# Patient Record
Sex: FEMALE | Race: WHITE | Employment: OTHER | URBAN - METROPOLITAN AREA
[De-identification: names, ages, dates, MRNs, and addresses within clinical notes are randomized per-mention and may not be internally consistent; named-entity substitution may affect disease eponyms.]

---

## 2019-06-04 ENCOUNTER — OFFICE VISIT (OUTPATIENT)
Dept: FAMILY MEDICINE CLINIC | Age: 64
End: 2019-06-04

## 2019-06-04 ENCOUNTER — HOSPITAL ENCOUNTER (OUTPATIENT)
Dept: LAB | Age: 64
Discharge: HOME | End: 2019-06-04
Payer: MEDICARE

## 2019-06-04 VITALS
RESPIRATION RATE: 18 BRPM | TEMPERATURE: 97.9 F | HEIGHT: 68 IN | BODY MASS INDEX: 17.58 KG/M2 | DIASTOLIC BLOOD PRESSURE: 80 MMHG | OXYGEN SATURATION: 97 % | WEIGHT: 116 LBS | SYSTOLIC BLOOD PRESSURE: 114 MMHG | HEART RATE: 55 BPM

## 2019-06-04 DIAGNOSIS — D50.8 OTHER IRON DEFICIENCY ANEMIA: ICD-10-CM

## 2019-06-04 DIAGNOSIS — K92.1 MELENA: ICD-10-CM

## 2019-06-04 DIAGNOSIS — R10.11 RUQ PAIN: Primary | ICD-10-CM

## 2019-06-04 DIAGNOSIS — R53.81 MALAISE AND FATIGUE: ICD-10-CM

## 2019-06-04 DIAGNOSIS — R63.4 WEIGHT LOSS: ICD-10-CM

## 2019-06-04 DIAGNOSIS — R10.11 RUQ PAIN: ICD-10-CM

## 2019-06-04 DIAGNOSIS — R53.83 MALAISE AND FATIGUE: ICD-10-CM

## 2019-06-04 LAB
ALBUMIN SERPL-MCNC: 4.2 G/DL (ref 3.4–5)
ALBUMIN/GLOB SERPL: 1.6 {RATIO} (ref 0.8–1.7)
ALP SERPL-CCNC: 75 U/L (ref 45–117)
ALT SERPL-CCNC: 22 U/L (ref 13–56)
ANION GAP SERPL CALC-SCNC: 5 MMOL/L (ref 3–18)
AST SERPL-CCNC: 11 U/L (ref 15–37)
BASOPHILS # BLD: 0 K/UL (ref 0–0.1)
BASOPHILS NFR BLD: 0 % (ref 0–2)
BILIRUB SERPL-MCNC: 0.4 MG/DL (ref 0.2–1)
BUN SERPL-MCNC: 13 MG/DL (ref 7–18)
BUN/CREAT SERPL: 20 (ref 12–20)
CALCIUM SERPL-MCNC: 8.5 MG/DL (ref 8.5–10.1)
CHLORIDE SERPL-SCNC: 107 MMOL/L (ref 100–108)
CO2 SERPL-SCNC: 30 MMOL/L (ref 21–32)
CREAT SERPL-MCNC: 0.66 MG/DL (ref 0.6–1.3)
DIFFERENTIAL METHOD BLD: ABNORMAL
EOSINOPHIL # BLD: 0.1 K/UL (ref 0–0.4)
EOSINOPHIL NFR BLD: 2 % (ref 0–5)
ERYTHROCYTE [DISTWIDTH] IN BLOOD BY AUTOMATED COUNT: 12.6 % (ref 11.6–14.5)
FERRITIN SERPL-MCNC: 56 NG/ML (ref 8–388)
GLOBULIN SER CALC-MCNC: 2.7 G/DL (ref 2–4)
GLUCOSE SERPL-MCNC: 82 MG/DL (ref 74–99)
HCT VFR BLD AUTO: 36.7 % (ref 35–45)
HGB BLD-MCNC: 11.3 G/DL (ref 12–16)
IRON SATN MFR SERPL: 17 %
IRON SERPL-MCNC: 53 UG/DL (ref 50–175)
LIPASE SERPL-CCNC: 80 U/L (ref 73–393)
LYMPHOCYTES # BLD: 1.4 K/UL (ref 0.9–3.6)
LYMPHOCYTES NFR BLD: 31 % (ref 21–52)
MCH RBC QN AUTO: 28.6 PG (ref 24–34)
MCHC RBC AUTO-ENTMCNC: 30.8 G/DL (ref 31–37)
MCV RBC AUTO: 92.9 FL (ref 74–97)
MONOCYTES # BLD: 0.3 K/UL (ref 0.05–1.2)
MONOCYTES NFR BLD: 7 % (ref 3–10)
NEUTS SEG # BLD: 2.7 K/UL (ref 1.8–8)
NEUTS SEG NFR BLD: 60 % (ref 40–73)
PLATELET # BLD AUTO: 148 K/UL (ref 135–420)
PMV BLD AUTO: 12.5 FL (ref 9.2–11.8)
POTASSIUM SERPL-SCNC: 4.8 MMOL/L (ref 3.5–5.5)
PROT SERPL-MCNC: 6.9 G/DL (ref 6.4–8.2)
RBC # BLD AUTO: 3.95 M/UL (ref 4.2–5.3)
SODIUM SERPL-SCNC: 142 MMOL/L (ref 136–145)
TIBC SERPL-MCNC: 303 UG/DL (ref 250–450)
WBC # BLD AUTO: 4.5 K/UL (ref 4.6–13.2)

## 2019-06-04 PROCEDURE — 82728 ASSAY OF FERRITIN: CPT

## 2019-06-04 PROCEDURE — 80053 COMPREHEN METABOLIC PANEL: CPT

## 2019-06-04 PROCEDURE — 83540 ASSAY OF IRON: CPT

## 2019-06-04 PROCEDURE — 82150 ASSAY OF AMYLASE: CPT

## 2019-06-04 PROCEDURE — 83690 ASSAY OF LIPASE: CPT

## 2019-06-04 PROCEDURE — 85025 COMPLETE CBC W/AUTO DIFF WBC: CPT

## 2019-06-04 NOTE — PATIENT INSTRUCTIONS
Results/Referral Notifications: Please contact our office if you have not received a call or MyChart message with the results of your tests or with an appointment plan for any referrals/studies within one week. No news is not good news; it's no news. Prescriptions: It is my practice to ensure that any prescription I generate has a large enough supply with enough refills to last you through and beyond the time I am next expecting to see you. If your medications are running low, contact your pharmacy for a refill. If there are no refills remaining, OR if it has been one calendar year since the prescription was generated, it is time for you to see me to reassess the status of your condition and to determine whether the medication is still appropriate. It is the patient's responsibility to ensure follow up appointments are scheduled before medications run out. Details depending, once a visit has been scheduled, a request for a small supply to may be authorized. Please review the list of your current medications (name, formulation, strength and dosing instructions) and allergies and call us if there is an error. It is good practice to bring all of your prescriptions, over the counter medications and herbal supplements to each visit. Learn what conditions your medications are treating. Know which doctor is responsible for managing each condition. If you are needing more medication, contact the office of the doctor managing the condition for which it was prescribed.

## 2019-06-04 NOTE — PROGRESS NOTES
Allyn Calderón is a 75y.o. year old female who is a new patient to me today. Assessment & Plan:       ICD-10-CM ICD-9-CM    1. RUQ pain R10.11 789.01 CBC WITH AUTOMATED DIFF      FERRITIN      IRON PROFILE      METABOLIC PANEL, COMPREHENSIVE      AMYLASE ISOENZYMES      LIPASE      REFERRAL TO GASTROENTEROLOGY      [facility]   2. Melena K92.1 578.1 CBC WITH AUTOMATED DIFF      FERRITIN      IRON PROFILE      REFERRAL TO GASTROENTEROLOGY   3. Weight loss R63.4 783.21 REFERRAL TO GASTROENTEROLOGY   4. Malaise and fatigue R53.81 780.79     R53.83       RUQ pain certainly could be due to GB disease, with weight loss from the self imposed dietary restriction vs baseline. However, the abnormal stools 2 months ago and continued malaise and weakness despite having maintained a healthy diet are concerning. DDx includes but is not limited to PUD, pancreatitis, hepatitis, colon AVM, benign and malignant neoplasms. Requested she provide details on her herbs    Labs, image and refer  Low threshold for CT  Subject to change    Subjective:   Allyn Calderón was seen today for Establish Care    complains of \"congestion\" RUQ: \"full\" feeling with radiation to the right flank starting early April. Constant x 6 first weeks, spontaneous improvement, then episodic with trigger foods (BBQ, fried fish) and also triggered by eating large meals. On 2 occasions \"full\" pain became \"sharp\" when rotated torso to the left while experiencing fullness. Had been associated with constipation    Has an herbalist Titi davies\" who she called for herbs to help with constipation, now with satisfactory elimination daily most days/week    Changed diet content and size: eliminated fatty/fried foods, citrus fruits, dairy, eggs - not sure whether the citrus was causative but other foods did seem to trigger pain    Focusing on grains, which she grinds herself, vegetables, some fish and chicken perhaps 1 day/week.  Consuming 4 average soup bowl sized portions 4x/day    Changes have relieved RUQ pain completely    Resulting weight loss: down from 123-126 baseline to current state in the past 2 months, 2 notches tighter on her belt    Feels weak, tires easily      Soc: moved here in January from [facility], single, boyfriend; retired massage therapist; teaches yoga  [employer]      Brings outside labs from April (approx 1 week into symptoms) which show normal CBC, CMP and lipids    Describes a CCa fecal test late 2018    No care team member to display    No Known Allergies    Outpatient Medications Marked as Taking for the 6/4/19 encounter (Office Visit) with Ellyn Reese MD   Medication Sig Dispense Refill    OTHER Indications: Patient takes herbs from [employer]         There are no active problems to display for this patient. History reviewed. No pertinent past medical history.     Past Surgical History:   Procedure Laterality Date    HX GYN      Fibroidectomy x 2    HX HYSTERECTOMY         Family History   Problem Relation Age of Onset    Alcohol abuse Mother     Alcohol abuse Father     Bleeding Prob Father     Alcohol abuse Maternal Grandmother     Alcohol abuse Maternal Grandfather     Alcohol abuse Paternal Grandmother     Alcohol abuse Paternal Grandfather        Social History     Socioeconomic History    Marital status: SINGLE     Spouse name: Not on file    Number of children: Not on file    Years of education: Not on file    Highest education level: Not on file   Occupational History    Not on file   Social Needs    Financial resource strain: Not on file    Food insecurity:     Worry: Not on file     Inability: Not on file    Transportation needs:     Medical: Not on file     Non-medical: Not on file   Tobacco Use    Smoking status: Never Smoker    Smokeless tobacco: Never Used   Substance and Sexual Activity    Alcohol use: Not Currently     Frequency: Never    Drug use: Never    Sexual activity: Not on file Lifestyle    Physical activity:     Days per week: Not on file     Minutes per session: Not on file    Stress: Not on file   Relationships    Social connections:     Talks on phone: Not on file     Gets together: Not on file     Attends Congregation service: Not on file     Active member of club or organization: Not on file     Attends meetings of clubs or organizations: Not on file     Relationship status: Not on file    Intimate partner violence:     Fear of current or ex partner: Not on file     Emotionally abused: Not on file     Physically abused: Not on file     Forced sexual activity: Not on file   Other Topics Concern    Not on file   Social History Narrative    Not on file            Review of Systems   Constitutional: Negative for fever. Respiratory: Negative for cough and hemoptysis. Cardiovascular: Positive for chest pain (remote, benign evaluation). Negative for leg swelling. Gastrointestinal: Positive for melena (approx 2 months ago x 10 days). Negative for blood in stool, nausea and vomiting. Genitourinary: Negative for dysuria, frequency, hematuria and urgency. Musculoskeletal: Negative for myalgias. Skin: Negative for rash. Neurological: Negative for focal weakness. Endo/Heme/Allergies: Does not bruise/bleed easily. Psychiatric/Behavioral: Negative for depression. Objective:     Visit Vitals  /80   Pulse (!) 55   Temp 97.9 °F (36.6 °C) (Oral)   Resp 18   Ht 5' 8\" (1.727 m)   Wt 166 lb 6.4 oz (75.5 kg)   SpO2 97%   BMI 25.30 kg/m²      Physical Exam   Constitutional: She is oriented to person, place, and time. She appears well-developed. No distress. Pleasant thin white female   HENT:   Head: Normocephalic and atraumatic. Right Ear: External ear normal.   Left Ear: External ear normal.   Mouth/Throat: Oropharynx is clear and moist.   Eyes: Conjunctivae are normal. Right eye exhibits no discharge. Left eye exhibits no discharge. No scleral icterus.    Neck: Neck supple. Cardiovascular: Normal rate, regular rhythm and normal heart sounds. Exam reveals no gallop and no friction rub. No murmur heard. Pulmonary/Chest: Effort normal and breath sounds normal. No respiratory distress. She has no wheezes. She has no rhonchi. She has no rales. Abdominal: Soft. Bowel sounds are normal. She exhibits no distension and no mass. There is no tenderness. Musculoskeletal: She exhibits no edema. Lymphadenopathy:     She has no cervical adenopathy. Neurological: She is alert and oriented to person, place, and time. No gross deficits   Skin: Skin is warm and dry. No rash noted. She is not diaphoretic. No cyanosis. Nails show no clubbing. Psychiatric: She has a normal mood and affect. Her behavior is normal. Judgment and thought content normal.                      Disclaimer: The patient understands our medical plan. Alternatives have been explained and offered. The risks, benefits and significant side effects of all medications have been reviewed. Anticipated time course and progression of condition reviewed. All questions have been addressed. She is encouraged to employ the information provided in the after visit summary, which was reviewed. Where applicable, she is instructed to call the clinic if she has not been notified either by phone or through [address] with the results of her tests or with an appointment plan for any referrals within 1 week(s). No news is not good news; it's no news. The patient  is to call if her condition worsens or fails to improve or if significant side effects are experienced. Aspects of this note may have been generated using voice recognition software. Despite editing, there may be unrecognized errors.        Roxie Bernard MD

## 2019-06-04 NOTE — PROGRESS NOTES
New patient states she is here to establish care she states she would like to discuss some GI issues with provider. Patient has asked she be roomed with exam room door opened. Medication reconciliation has been completed with patient. Care team discussed/updated as well as pharmacy. Health Maintenance reviewed Patient states she has her previous records and will email them to the office via [employer].

## 2019-06-06 ENCOUNTER — TELEPHONE (OUTPATIENT)
Dept: FAMILY MEDICINE CLINIC | Age: 64
End: 2019-06-06

## 2019-06-06 LAB
AMYLASE P SERPL-CCNC: 16 U/L (ref 10–53)
AMYLASE S SERPL-CCNC: 34 U/L (ref 11–83)
AMYLASE SERPL-CCNC: 50 U/L (ref 31–124)

## 2019-06-06 RX ORDER — LANOLIN ALCOHOL/MO/W.PET/CERES
325 CREAM (GRAM) TOPICAL
Qty: 60 TAB | Refills: 0 | Status: SHIPPED | OUTPATIENT
Start: 2019-06-06 | End: 2020-12-08 | Stop reason: SINTOL

## 2019-06-06 NOTE — PROGRESS NOTES
Please schedule follow up for iron deficiency/RUQ pain in 1 month with labs prior. Please forward labs along with GI referral to Dr. Marcus Rubinstein.  FERN

## 2019-06-06 NOTE — TELEPHONE ENCOUNTER
Patient called at 11:29 requesting a call back from angelina, she have a question about information she is seeing on my Chart. Please call [phone].

## 2019-06-07 NOTE — TELEPHONE ENCOUNTER
Called patient had her verify her  she was told had sent her a [employer] message letting her know she can send in her information to us this way.

## 2019-06-13 ENCOUNTER — TELEPHONE (OUTPATIENT)
Dept: FAMILY MEDICINE CLINIC | Age: 64
End: 2019-06-13

## 2019-06-13 ENCOUNTER — HOSPITAL ENCOUNTER (OUTPATIENT)
Dept: ULTRASOUND IMAGING | Age: 64
Discharge: HOME | End: 2019-06-13
Attending: FAMILY MEDICINE
Payer: MEDICARE

## 2019-06-13 DIAGNOSIS — R10.11 RUQ PAIN: ICD-10-CM

## 2019-06-13 PROCEDURE — 76705 ECHO EXAM OF ABDOMEN: CPT

## 2019-06-13 NOTE — TELEPHONE ENCOUNTER
Called patient back no answer left message letting her know I was returning her call also told I let her know Dr. Juan Ramon Ulloa had sent a [employer] message to her on her results. Asked that she call the office back office number left.

## 2019-06-13 NOTE — TELEPHONE ENCOUNTER
Patient would like a return call in ref to her [facility] results. Please call [phone]

## 2019-06-13 NOTE — PROGRESS NOTES
Allen Guaman, please ensure her GI consultation has been scheduled. \"Normal study, Memory Clear, including the finding of no gallstones. This makes an evaluation by a gastroenterologist all the more important to determine the source of your pain and blood loss.  KJS\"

## 2019-06-18 NOTE — TELEPHONE ENCOUNTER
Called patient had her verify her  she has seen the [employer] message and has been seen by GI

## 2019-07-22 ENCOUNTER — LAB ONLY (OUTPATIENT)
Dept: FAMILY MEDICINE CLINIC | Age: 64
End: 2019-07-22

## 2019-07-22 ENCOUNTER — HOSPITAL ENCOUNTER (OUTPATIENT)
Dept: LAB | Age: 64
Discharge: HOME | End: 2019-07-22
Payer: MEDICARE

## 2019-07-22 DIAGNOSIS — R53.81 MALAISE AND FATIGUE: ICD-10-CM

## 2019-07-22 DIAGNOSIS — R53.83 MALAISE AND FATIGUE: ICD-10-CM

## 2019-07-22 DIAGNOSIS — D50.8 OTHER IRON DEFICIENCY ANEMIA: Primary | ICD-10-CM

## 2019-07-22 DIAGNOSIS — D50.8 OTHER IRON DEFICIENCY ANEMIA: ICD-10-CM

## 2019-07-22 LAB
BASOPHILS # BLD: 0 K/UL (ref 0–0.1)
BASOPHILS NFR BLD: 0 % (ref 0–2)
DIFFERENTIAL METHOD BLD: ABNORMAL
EOSINOPHIL # BLD: 0.1 K/UL (ref 0–0.4)
EOSINOPHIL NFR BLD: 1 % (ref 0–5)
ERYTHROCYTE [DISTWIDTH] IN BLOOD BY AUTOMATED COUNT: 12.8 % (ref 11.6–14.5)
HCT VFR BLD AUTO: 41.5 % (ref 35–45)
HGB BLD-MCNC: 13 G/DL (ref 12–16)
LYMPHOCYTES # BLD: 1.5 K/UL (ref 0.9–3.6)
LYMPHOCYTES NFR BLD: 34 % (ref 21–52)
MCH RBC QN AUTO: 29.5 PG (ref 24–34)
MCHC RBC AUTO-ENTMCNC: 31.3 G/DL (ref 31–37)
MCV RBC AUTO: 94.3 FL (ref 74–97)
MONOCYTES # BLD: 0.5 K/UL (ref 0.05–1.2)
MONOCYTES NFR BLD: 10 % (ref 3–10)
NEUTS SEG # BLD: 2.5 K/UL (ref 1.8–8)
NEUTS SEG NFR BLD: 55 % (ref 40–73)
PLATELET # BLD AUTO: 167 K/UL (ref 135–420)
PMV BLD AUTO: 12.5 FL (ref 9.2–11.8)
RBC # BLD AUTO: 4.4 M/UL (ref 4.2–5.3)
WBC # BLD AUTO: 4.5 K/UL (ref 4.6–13.2)

## 2019-07-22 PROCEDURE — 85025 COMPLETE CBC W/AUTO DIFF WBC: CPT

## 2019-07-30 ENCOUNTER — OFFICE VISIT (OUTPATIENT)
Dept: FAMILY MEDICINE CLINIC | Age: 64
End: 2019-07-30

## 2019-07-30 VITALS
BODY MASS INDEX: 17.43 KG/M2 | RESPIRATION RATE: 12 BRPM | HEIGHT: 68 IN | SYSTOLIC BLOOD PRESSURE: 96 MMHG | HEART RATE: 67 BPM | DIASTOLIC BLOOD PRESSURE: 68 MMHG | TEMPERATURE: 98.4 F | OXYGEN SATURATION: 94 % | WEIGHT: 115 LBS

## 2019-07-30 DIAGNOSIS — R63.6 UNDERWEIGHT: ICD-10-CM

## 2019-07-30 DIAGNOSIS — D50.8 OTHER IRON DEFICIENCY ANEMIA: Primary | ICD-10-CM

## 2019-07-30 DIAGNOSIS — L98.9 BENIGN SKIN LESION: ICD-10-CM

## 2019-07-30 PROBLEM — D64.9 ABSOLUTE ANEMIA: Status: ACTIVE | Noted: 2019-07-30

## 2019-07-30 NOTE — PROGRESS NOTES
Kendrick Vo is a 75 y.o. female who was seen in clinic today (7/30/2019). Assessment & Plan:       ICD-10-CM ICD-9-CM    1. Other iron deficiency anemia D50.8 280.8 CBC WITH AUTOMATED DIFF      FERRITIN      IRON PROFILE   2. Underweight R63.6 783.22    3. Benign skin lesion L98.9 709.9      Iron: improving  Weight: unchanged despite control of constipation and normalization of dietary intake. Short term follow up    Benign skin lesion: schedule destruction or excision as prefers  Discussed risks: pain, scarring, repeat procedure. Follow-up and Dispositions    · Return in about 4 months (around 11/30/2019) for iron deficiency and underweight status follow up, non fasting labs 1 week prior, , MWV same day. Subjective:   Kendrick Vo was seen today for Anemia    Follow-up iron deficiency anemia, supplementing as per MA Philmore Severance' note. Post colo showing potential non malignant bleeding source in the cecum. No follow up with GI scheduled. No clear explanation for prior constipation or discomfort with eating, but now resolved/controlled with herbs in addition to the high iron herbs she's also consuming    Now able to eat without discomfort x > 1 month    Swimming again    Irritation of preexisting skin lesion right upper back since started swimming    Results for orders placed or performed during the hospital encounter of 07/22/19   CBC WITH AUTOMATED DIFF   Result Value Ref Range    WBC 4.5 (L) 4.6 - 13.2 K/uL    RBC 4.40 4.20 - 5.30 M/uL    HGB 13.0 12.0 - 16.0 g/dL    HCT 41.5 35.0 - 45.0 %    MCV 94.3 74.0 - 97.0 FL    MCH 29.5 24.0 - 34.0 PG    MCHC 31.3 31.0 - 37.0 g/dL    RDW 12.8 11.6 - 14.5 %    PLATELET 517 397 - 749 K/uL    MPV 12.5 (H) 9.2 - 11.8 FL    NEUTROPHILS 55 40 - 73 %    LYMPHOCYTES 34 21 - 52 %    MONOCYTES 10 3 - 10 %    EOSINOPHILS 1 0 - 5 %    BASOPHILS 0 0 - 2 %    ABS. NEUTROPHILS 2.5 1.8 - 8.0 K/UL    ABS. LYMPHOCYTES 1.5 0.9 - 3.6 K/UL    ABS.  MONOCYTES 0.5 0.05 - 1.2 K/UL    ABS. EOSINOPHILS 0.1 0.0 - 0.4 K/UL    ABS. BASOPHILS 0.0 0.0 - 0.1 K/UL    DF AUTOMATED          Outpatient Medications Marked as Taking for the 7/30/19 encounter (Office Visit) with Jalen Beckford MD   Medication Sig Dispense Refill    OTHER Take 1 Packet by mouth daily. Indications: Powered Amalaki Fruit      OTHER Take 2 Caps by mouth daily. Indications: Liver Detox Capsule      OTHER Indications: Faby Amalaki      OTHER Indications: Punamavadi      OTHER Indications: Vidanga      OTHER Indications: Manjistha      OTHER Indications: Kutki      OTHER Indications: Guduchi      OTHER Indications: Neem      OTHER Indications: Kalmag      OTHER Indications: Mahasudarshan Churna      OTHER Indications: Licorice      OTHER Take 3 Packets by mouth after meals as needed. Indications: Avipattikar Powder      ginger root (GINGER EXTRACT PO) Take  by mouth.  OTHER Indications: Piper nigrum      OTHER Indications: Piper Longum      OTHER Indications: Haritaki Fruit      OTHER Indications: Bibhitaki Fruit      OTHER Indications: Amalaki      OTHER Indications: Musta Rizome      OTHER Indications: Becky Seed         There are no active problems to display for this patient. No Known Allergies      Patient Care Team:  Jalen Beckford MD as PCP - [facility])    The following sections were reviewed & updated as appropriate: PMH, PSH, FH, and SH. Review of Systems   Constitutional: Negative for malaise/fatigue. Gastrointestinal: Negative for blood in stool. Neurological: Negative for dizziness. Objective:     Visit Vitals  BP 96/68   Pulse 67   Temp 98.4 °F (36.9 °C) (Oral)   Resp 12   Ht 5' 8\" (1.727 m)   Wt 115 lb (52.2 kg)   SpO2 94%   BMI 17.49 kg/m²      Physical Exam   Constitutional: She is oriented to person, place, and time. She appears well-developed. No distress.    Pleasant thin white female   HENT:   Head: Normocephalic and atraumatic. Pulmonary/Chest: Effort normal.   Neurological: She is alert and oriented to person, place, and time. Skin:        Psychiatric: She has a normal mood and affect. Her behavior is normal. Judgment and thought content normal.         Disclaimer: The patient understands our medical plan. Alternatives have been explained and offered. The risks, benefits and significant side effects of all medications have been reviewed. Anticipated time course and progression of condition reviewed. All questions have been addressed. She is encouraged to employ the information provided in the after visit summary, which was reviewed. Where applicable, she is instructed to call the clinic if she has not been notified either by phone or through [address] with the results of her tests or with an appointment plan for any referrals within 1 week(s). No news is not good news; it's no news. The patient  is to call if her condition worsens or fails to improve or if significant side effects are experienced. Aspects of this note may have been generated using voice recognition software. Despite editing, there may be unrecognized errors.        Kaleb Arellano MD

## 2019-07-30 NOTE — PROGRESS NOTES
Patient here for follow up on anemia. She is not taking her Ferrous Sulfate and says she is taking herbs that are rich in iron, she also is making herself a tea that is also rich in iron as well as has increased iron rich foods. 1. Have you been to the ER, urgent care clinic since your last visit? Hospitalized since your last visit? No  2. Have you seen or consulted any other health care providers outside of the [address] [place] [address] since your last visit? Include any pap smears or colon screening. Yes When: July 2019 Where: [place] [address] Reason for visit: colonoscopy    Medication reconciliation has been completed with patient. Care team discussed/updated as well as pharmacy. Health Maintenance Due   Topic Date Due    Hepatitis C Screening  1955    COLONOSCOPY  05/24/1971    DTaP/Tdap/Td series (1 - Tdap) 05/24/1974    Shingrix Vaccine Age 50> (1 of 2) 05/24/2003    BREAST CANCER SCRN MAMMOGRAM  05/24/2003    GLAUCOMA SCREENING Q2Y  05/24/2018    Bone Densitometry (Dexa) Screening  05/24/2018    Pneumococcal 65+ years (1 of 2 - PCV13) 05/24/2018    MEDICARE YEARLY EXAM  06/04/2019     Health Maintenance reviewed - Patient has been asked to schedule her [address] Lakeisha Wick

## 2019-08-02 ENCOUNTER — OFFICE VISIT (OUTPATIENT)
Dept: FAMILY MEDICINE CLINIC | Age: 64
End: 2019-08-02

## 2019-08-02 VITALS
OXYGEN SATURATION: 94 % | HEIGHT: 68 IN | WEIGHT: 114.2 LBS | HEART RATE: 62 BPM | DIASTOLIC BLOOD PRESSURE: 66 MMHG | TEMPERATURE: 98.2 F | RESPIRATION RATE: 16 BRPM | SYSTOLIC BLOOD PRESSURE: 110 MMHG | BODY MASS INDEX: 17.31 KG/M2

## 2019-08-02 DIAGNOSIS — D22.9 BENIGN NEVUS OF SKIN: Primary | ICD-10-CM

## 2019-08-02 NOTE — PATIENT INSTRUCTIONS
Wound Care: After Your Visit Your Care Instructions Taking good care of your wound at home will help it heal quickly and reduce your chance of infection. The doctor has checked you carefully, but problems can develop later. If you notice any problems or new symptoms, get medical treatment right away. Follow-up care is a key part of your treatment and safety. Be sure to make and go to all appointments, and call your doctor if you are having problems. It's also a good idea to know your test results and keep a list of the medicines you take. How can you care for yourself at home? · Clean the area with soap and water 2 times a day unless your doctor gives you different instructions. Don't use hydrogen peroxide or alcohol, which can slow healing. ¨ You may cover the wound with a thin layer of antibiotic ointment, such as bacitracin, and a nonstick bandage. ¨ Apply more ointment and replace the bandage as needed. · Take pain medicines exactly as directed. Some pain is normal with a wound, but do not ignore pain that is getting worse instead of better. You could have an infection. ¨ If the doctor gave you a prescription medicine for pain, take it as prescribed. ¨ If you are not taking a prescription pain medicine, ask your doctor if you can take an over-the-counter medicine. · Your doctor may have closed your wound with stitches (sutures), staples, or skin glue. ¨ If you have stitches, your doctor may remove them after several days to 2 weeks. Or you may have stitches that dissolve on their own. ¨ If you have staples, your doctor may remove them after 7 to 10 days. ¨ If your wound was closed with skin glue, the glue will wear off in a few days to 2 weeks. When should you call for help? Call your doctor now or seek immediate medical care if: 
· You have signs of infection, such as: 
¨ Increased pain, swelling, warmth, or redness near the wound. ¨ Red streaks leading from the wound. ¨ Pus draining from the wound. ¨ A fever. · You bleed so much from your incision that you soak one or more bandages over 2 to 4 hours. Watch closely for changes in your health, and be sure to contact your doctor if: · The wound is not getting better each day. Where can you learn more? Go to [employer] Enter M467 in the search box to learn more about \"Wound Care: After Your Visit. \"  
© 8793-2654 Healthwise, [employer]orporated. Care instructions adapted under license by [facility] (which disclaims liability or warranty for this information). This care instruction is for use with your licensed healthcare professional. If you have questions about a medical condition or this instruction, always ask your healthcare professional. [address] any warranty or liability for your use of this information. Content Version: 17.7.049693; Last Revised: April 23, 2012

## 2019-08-02 NOTE — PROGRESS NOTES
Arcenio Tan is a 75 y.o. female who was seen in clinic today (8/2/2019). Assessment & Plan:       ICD-10-CM ICD-9-CM    1. Benign nevus of skin D22.9 216.9 DESTRUC BENIGN LESION, UP TO 14 LESIONS ([id number])      Medically indicated due to irritation by clothing and pool water  Wound care instructions given with good understanding    Follow-up and Dispositions    · Return in about 1 month (around 9/2/2019) for lesion freezing if needed. Subjective:   Arcenio Tan is a 75 y.o. female    Here for lesion destruction. Reviewed risks of discomfort, infection, scarring, need for repeat treatment. Inflamed benign nevus 5 mm diameter right upper back    DESTRUC BENIGN LESION, UP TO 14 LESIONS (81370)  Date/Time: 8/2/2019 2:40 PM  Performed by: Marely Mendoza MD  Authorized by: Marely Mendoza MD     Informed Consent:     Verbal consent obtained?: Yes      Written consent obtained?: No    Details:     Performed by:   Attending    Preparation:  Skin prepped with alcohol    Pre-procedure Re-Eval: Patient re-evaluated and found suitable for planned procedure and meds      Location:  Back (right upper)    Lesion Type: benign nevus    Number of Lesions: 1    Treatment Type: cryotherapy    Dressing Applied: adhesive bandage    Estimated blood loss:  Zero    Patient tolerance:  Patient tolerated the procedure well with no immediate complications

## 2019-08-02 NOTE — PROGRESS NOTES
Patient here to have a mole removed from upper right shoulder. Patient has been given consent form. 1. Have you been to the ER, urgent care clinic since your last visit? Hospitalized since your last visit? No  2. Have you seen or consulted any other health care providers outside of the [address] [place] [address] since your last visit? Include any pap smears or colon screening. No    Medication reconciliation has been completed with patient. Care team discussed/updated as well as pharmacy.     Health Maintenance Due   Topic Date Due    Hepatitis C Screening  1955    DTaP/Tdap/Td series (1 - Tdap) 05/24/1974    Shingrix Vaccine Age 50> (1 of 2) 05/24/2003    BREAST CANCER SCRN MAMMOGRAM  05/24/2003    GLAUCOMA SCREENING Q2Y  05/24/2018    Bone Densitometry (Dexa) Screening  05/24/2018    Pneumococcal 65+ years (1 of 2 - PCV13) 05/24/2018    MEDICARE YEARLY EXAM  06/04/2019    Influenza Age 9 to Adult  08/01/2019

## 2019-08-04 DIAGNOSIS — D50.8 OTHER IRON DEFICIENCY ANEMIA: ICD-10-CM

## 2019-08-05 RX ORDER — LANOLIN ALCOHOL/MO/W.PET/CERES
CREAM (GRAM) TOPICAL
Qty: 60 TAB | Refills: 0 | OUTPATIENT
Start: 2019-08-05

## 2019-08-13 ENCOUNTER — TELEPHONE (OUTPATIENT)
Dept: FAMILY MEDICINE CLINIC | Age: 64
End: 2019-08-13

## 2019-08-13 NOTE — TELEPHONE ENCOUNTER
Ferrous sulfate marked as not taking in med reconciliation. Date last filled 6/6/19. Received request from [facility] requesting refill on this medication. Please review and advise. Last appointment was 8/2/19. 7/30/19 before that.

## 2019-08-14 NOTE — TELEPHONE ENCOUNTER
Called patient to see if she was requesting refills on this medication no answer left message asking her to call the office back.

## 2019-11-26 ENCOUNTER — HOSPITAL ENCOUNTER (OUTPATIENT)
Dept: LAB | Age: 64
Discharge: HOME | End: 2019-11-26
Payer: MEDICARE

## 2019-11-26 ENCOUNTER — LAB ONLY (OUTPATIENT)
Dept: FAMILY MEDICINE CLINIC | Age: 64
End: 2019-11-26

## 2019-11-26 DIAGNOSIS — D50.8 OTHER IRON DEFICIENCY ANEMIA: ICD-10-CM

## 2019-11-26 DIAGNOSIS — D50.8 OTHER IRON DEFICIENCY ANEMIA: Primary | ICD-10-CM

## 2019-11-26 LAB
BASOPHILS # BLD: 0 K/UL (ref 0–0.1)
BASOPHILS NFR BLD: 0 % (ref 0–2)
DIFFERENTIAL METHOD BLD: ABNORMAL
EOSINOPHIL # BLD: 0 K/UL (ref 0–0.4)
EOSINOPHIL NFR BLD: 1 % (ref 0–5)
ERYTHROCYTE [DISTWIDTH] IN BLOOD BY AUTOMATED COUNT: 12.5 % (ref 11.6–14.5)
HCT VFR BLD AUTO: 39.3 % (ref 35–45)
HGB BLD-MCNC: 12.6 G/DL (ref 12–16)
LYMPHOCYTES # BLD: 1.7 K/UL (ref 0.9–3.6)
LYMPHOCYTES NFR BLD: 37 % (ref 21–52)
MCH RBC QN AUTO: 29.7 PG (ref 24–34)
MCHC RBC AUTO-ENTMCNC: 32.1 G/DL (ref 31–37)
MCV RBC AUTO: 92.7 FL (ref 74–97)
MONOCYTES # BLD: 0.3 K/UL (ref 0.05–1.2)
MONOCYTES NFR BLD: 7 % (ref 3–10)
NEUTS SEG # BLD: 2.5 K/UL (ref 1.8–8)
NEUTS SEG NFR BLD: 55 % (ref 40–73)
PLATELET # BLD AUTO: 165 K/UL (ref 135–420)
PMV BLD AUTO: 12 FL (ref 9.2–11.8)
RBC # BLD AUTO: 4.24 M/UL (ref 4.2–5.3)
WBC # BLD AUTO: 4.5 K/UL (ref 4.6–13.2)

## 2019-11-26 PROCEDURE — 83540 ASSAY OF IRON: CPT

## 2019-11-26 PROCEDURE — 82728 ASSAY OF FERRITIN: CPT

## 2019-11-26 PROCEDURE — 85025 COMPLETE CBC W/AUTO DIFF WBC: CPT

## 2019-11-26 NOTE — PROGRESS NOTES
Patient here for lab draw only today name and  verified venipuncture performed on patients right arm was successful patient tolerated well.

## 2019-11-27 LAB
FERRITIN SERPL-MCNC: 48 NG/ML (ref 8–388)
IRON SATN MFR SERPL: 27 %
IRON SERPL-MCNC: 79 UG/DL (ref 50–175)
TIBC SERPL-MCNC: 295 UG/DL (ref 250–450)

## 2019-12-03 ENCOUNTER — OFFICE VISIT (OUTPATIENT)
Dept: FAMILY MEDICINE CLINIC | Age: 64
End: 2019-12-03

## 2019-12-03 VITALS
SYSTOLIC BLOOD PRESSURE: 100 MMHG | HEIGHT: 68 IN | BODY MASS INDEX: 18.82 KG/M2 | DIASTOLIC BLOOD PRESSURE: 72 MMHG | HEART RATE: 66 BPM | TEMPERATURE: 98.1 F | RESPIRATION RATE: 14 BRPM | WEIGHT: 124.2 LBS

## 2019-12-03 VITALS
HEART RATE: 66 BPM | WEIGHT: 124.2 LBS | BODY MASS INDEX: 18.82 KG/M2 | TEMPERATURE: 98.1 F | RESPIRATION RATE: 14 BRPM | DIASTOLIC BLOOD PRESSURE: 72 MMHG | SYSTOLIC BLOOD PRESSURE: 100 MMHG | HEIGHT: 68 IN

## 2019-12-03 DIAGNOSIS — D50.8 OTHER IRON DEFICIENCY ANEMIA: Primary | ICD-10-CM

## 2019-12-03 DIAGNOSIS — Z23 ENCOUNTER FOR IMMUNIZATION: ICD-10-CM

## 2019-12-03 DIAGNOSIS — Z87.19 HISTORY OF GI BLEED: ICD-10-CM

## 2019-12-03 DIAGNOSIS — Z13.39 SCREENING FOR ALCOHOLISM: ICD-10-CM

## 2019-12-03 DIAGNOSIS — Z78.0 POSTMENOPAUSAL STATE: ICD-10-CM

## 2019-12-03 DIAGNOSIS — Z12.31 ENCOUNTER FOR SCREENING MAMMOGRAM FOR MALIGNANT NEOPLASM OF BREAST: ICD-10-CM

## 2019-12-03 DIAGNOSIS — Z00.00 INITIAL MEDICARE ANNUAL WELLNESS VISIT: Primary | ICD-10-CM

## 2019-12-03 PROBLEM — D64.9 ABSOLUTE ANEMIA: Status: RESOLVED | Noted: 2019-07-30 | Resolved: 2019-12-03

## 2019-12-03 PROBLEM — R63.6 UNDERWEIGHT: Status: RESOLVED | Noted: 2019-07-30 | Resolved: 2019-12-03

## 2019-12-03 NOTE — ACP (ADVANCE CARE PLANNING)
Advance Care Planning (ACP) Provider Note - Comprehensive     Date of ACP Conversation: 12/03/19  Persons included in Conversation:  patient  Length of ACP Conversation in minutes: <16 minutes (Non-Billable)    Authorized Decision Maker (if patient is incapable of making informed decisions): This person is: Healthcare Agent/Medical Power of  under Advance Directive      She has an advanced directive - a copy HAS NOT been provided. General ACP for ALL Patients with Decision Making Capacity:  Importance of advance care planning, including choosing a healthcare agent to communicate patient's healthcare decisions if patient lost the ability to make decisions, such as after a sudden illness or accident  Understanding of the healthcare agent role was assessed and information provided  Opportunity offered to explore how cultural, Jehovah's witness, spiritual, or personal beliefs would affect decisions for future care  Exploration of values, goals, and preferences if recovery is not expected, even with continued medical treatment in the event of: Imminent death or severe, permanent brain injury: \"In these circumstances, what matters most to you? \"  Care focused more on comfort or quality of life.     Interventions Provided:  Recommended communicating the plan and making copies for the healthcare agent, personal physician, and others as appropriate (e.g., health system)  Recommended review of completed ACP document annually or upon change in health status

## 2019-12-03 NOTE — PROGRESS NOTES
This is an Initial Medicare Annual Wellness Exam (AWV) (Performed 12 months after IPPE or effective date of Medicare Part B enrollment, Once in a lifetime)    I have reviewed the patient's medical history in detail and updated the computerized patient record. History     Patient Active Problem List   Diagnosis Code    Underweight R63.6    Absolute anemia D64.9     History reviewed. No pertinent past medical history. Past Surgical History:   Procedure Laterality Date    HX GYN      Fibroidectomy x 2    HX HYSTERECTOMY       Current Outpatient Medications   Medication Sig Dispense Refill    OTHER Indications: Homemade herbal tea      BURDOCK ROOT PO Take  by mouth.  OTHER Indications: [place]      elderberry fruit (ELDERBERRY PO) Take  by mouth.  OTHER Indications: [place] thorn      OTHER Indications: Dandelion root      OTHER Indications: Licorice      ferrous sulfate 325 mg (65 mg iron) tablet Take 1 Tab by mouth Daily (before breakfast).  61 Tab 0     No Known Allergies    Family History   Problem Relation Age of Onset    Alcohol abuse Mother     Alcohol abuse Father     Bleeding Prob Father     Alcohol abuse Maternal Grandmother     Alcohol abuse Maternal Grandfather     Alcohol abuse Paternal Grandmother     Alcohol abuse Paternal Grandfather     Colon Cancer Neg Hx      Social History     Tobacco Use    Smoking status: Never Smoker    Smokeless tobacco: Never Used   Substance Use Topics    Alcohol use: Not Currently     Frequency: Never       Depression Risk Factor Screening:     3 most recent PHQ Screens 12/3/2019   Little interest or pleasure in doing things Not at all   Feeling down, depressed, irritable, or hopeless Not at all   Total Score PHQ 2 0   Trouble falling or staying asleep, or sleeping too much Not at all   Feeling tired or having little energy Not at all   Poor appetite, weight loss, or overeating Not at all   Feeling bad about yourself - or that you are a failure or have let yourself or your family down Not at all   Trouble concentrating on things such as school, work, reading, or watching TV Not at all   Moving or speaking so slowly that other people could have noticed; or the opposite being so fidgety that others notice Not at all   Thoughts of being better off dead, or hurting yourself in some way Not at all   PHQ 9 Score 0   How difficult have these problems made it for you to do your work, take care of your home and get along with others Not difficult at all       Alcohol Risk Factor Screening:   Do you average 1 drink per night or more than 7 drinks a week:  No    On any one occasion in the past three months have you have had more than 3 drinks containing alcohol:  No      Functional Ability and Level of Safety:   Hearing: Hearing is good. Activities of Daily Living: The home contains: no safety equipment. Patient does total self care     Ambulation: with no difficulty    Fall Risk:  Fall Risk Assessment, last 12 mths 12/3/2019   Able to walk? Yes   Fall in past 12 months? No       Abuse Screen:  Patient is not abused    Cognitive Screening   Has your family/caregiver stated any concerns about your memory: no  Cognitive Screening: Normal - observation    Patient Care Team   Patient Care Team:  Giovanna Lindo MD as PCP - General (Family Practice)  Giovanna Lindo MD as PCP [facility]    Assessment/Plan   Education and counseling provided:  Are appropriate based on today's review and evaluation  Patient has ACP and has been asked to bring a copy to the office    No results found for: CHOL, CHOLPOCT, CHOLX, CHLST, CHOLV, HDL, HDLPOC, HDLP, LDL, LDLCPOC, LDLC, DLDLP, VLDLC, VLDL, TGLX, TRIGL, TRIGP, TGLPOCT, CHHD, [address]  [facility]  Has recent lipid results at home    Lab Results   Component Value Date/Time    Glucose 82 06/04/2019 03:46 PM       Diagnoses and all orders for this visit:    1. Initial Medicare annual wellness visit    2. Screening for alcoholism  -     NM ANNUAL ALCOHOL SCREEN 15 MIN    3. Encounter for screening mammogram for malignant neoplasm of breast  -     ERICKA MAMMO BI SCREENING INCL CAD; Future    4. Postmenopausal state  -     DEXA BONE DENSITY STUDY AXIAL; Future    5. Encounter for immunization  -     INFLUENZA VIRUS VACCINE, HIGH DOSE SEASONAL, PRESERVATIVE FREE  -     ADMIN INFLUENZA VIRUS VAC         Health Maintenance Due   Topic Date Due    DTaP/Tdap/Td series (1 - Tdap) 05/24/1964    Shingrix Vaccine Age 50> (1 of 2) 05/24/2003    BREAST CANCER SCRN MAMMOGRAM  05/24/2003    GLAUCOMA SCREENING Q2Y  05/24/2018    Bone Densitometry (Dexa) Screening  05/24/2018    Pneumococcal 65+ years (1 of 1 - PPSV23) 05/24/2018    MEDICARE YEARLY EXAM  06/04/2019    Influenza Age 9 to Adult  08/01/2019       Follow-up and Dispositions    · Return in about 1 month (around 1/3/2020) for Pneumovax nurse visit, Medicare Wellness Visit 1 year.

## 2019-12-03 NOTE — PATIENT INSTRUCTIONS
Medicare Wellness Visit, Female The best way to live healthy is to have a lifestyle where you eat a well-balanced diet, exercise regularly, limit alcohol use, and quit all forms of tobacco/nicotine, if applicable. Regular preventive services are another way to keep healthy. Preventive services (vaccines, screening tests, monitoring & exams) can help personalize your care plan, which helps you manage your own care. Screening tests can find health problems at the earliest stages, when they are easiest to treat. [facility] follows the current, evidence-based guidelines published by the [facility] ([facility]) when recommending preventive services for our patients. Because we follow these guidelines, sometimes recommendations change over time as research supports it. (For example, mammograms used to be recommended annually. Even though Medicare will still pay for an annual mammogram, the newer guidelines recommend a mammogram every two years for women of average risk). Of course, you and your doctor may decide to screen more often for some diseases, based on your risk and your co-morbidities (chronic disease you are already diagnosed with). Preventive services for you include: - Medicare offers their members a free annual wellness visit, which is time for you and your primary care provider to discuss and plan for your preventive service needs. Take advantage of this benefit every year! 
-All adults over the age of 72 should receive the recommended pneumonia vaccines. Current USPSTF guidelines recommend a series of two vaccines for the best pneumonia protection.  
-All adults should have a flu vaccine yearly and a tetanus vaccine every 10 years.  
-All adults age 48 and older should receive the shingles vaccines (series of two vaccines). -All adults age 38-68 who are overweight should have a diabetes screening test once every three years. -All adults born between [birth year] and 1965 should be screened once for Hepatitis C. 
-Other screening tests and preventive services for persons with diabetes include: an eye exam to screen for diabetic retinopathy, a kidney function test, a foot exam, and stricter control over your cholesterol.  
-Cardiovascular screening for adults with routine risk involves an electrocardiogram (ECG) at intervals determined by your doctor.  
-Colorectal cancer screenings should be done for adults age 54-65 with no increased risk factors for colorectal cancer. There are a number of acceptable methods of screening for this type of cancer. Each test has its own benefits and drawbacks. Discuss with your doctor what is most appropriate for you during your annual wellness visit. The different tests include: colonoscopy (considered the best screening method), a fecal occult blood test, a fecal DNA test, and sigmoidoscopy. 
 
-A bone mass density test is recommended when a woman turns 65 to screen for osteoporosis. This test is only recommended one time, as a screening. Some providers will use this same test as a disease monitoring tool if you already have osteoporosis. -Breast cancer screenings are recommended every other year for women of normal risk, age 54-69. 
-Cervical cancer screenings for women over age 72 are only recommended with certain risk factors. Here is a list of your current Health Maintenance items (your personalized list of preventive services) with a due date: 
Health Maintenance Due Topic Date Due  
 Hepatitis C Test  1955  DTaP/Tdap/Td  (1 - Tdap) 05/24/1964  Shingles Vaccine (1 of 2) 05/24/2003  Mammogram  05/24/2003  Glaucoma Screening   05/24/2018  Bone Mineral Density   05/24/2018  Pneumococcal Vaccine (1 of 1 - PPSV23) 05/24/2018 [facility] Annual Well Visit  06/04/2019  Flu Vaccine  08/01/2019 Learning About Breast Cancer Screening What is breast cancer screening? Breast cancer occurs when cells that are not normal grow in one or both of your breasts. Screening tests can help find breast cancer early. Cancer is easier to treat when it's found early. Having concerns about breast cancer is common. That's why it's important to talk with your doctor about when to start and how often to get screened for breast cancer. How is breast cancer screening done? Several screening tests can be used to check for breast cancer. · Mammograms check for signs of cancer using X-rays. They can show tumors that are too small for you or your doctor to feel. During a mammogram, a machine squeezes your breasts to make them flatter and easier to X-ray. At least two pictures are taken of each breast. One is taken from the top and one from the side. · 3-D mammograms are also called digital breast tomosynthesis. Your breast is positioned on a flat plate. A top plate is pressed against your breast to keep it in position. The X-ray arm then moves in an arc above the breast and takes many pictures. A computer uses these X-rays to create a three-dimensional image. · Clinical breast exams are a doctor's exam. Your doctor carefully feels your breasts and under your arms to check for lumps or other changes. After the screening, your doctor will tell you the results. You will also be told if you need any follow-up tests. When should you get screened? Talk with your doctor about when you should start being tested for breast cancer. How often you get tested and the kind of tests you get will depend on your age and your risk. The guidelines that follow are for women who have an average risk for breast cancer. If you have a higher risk for breast cancer, such as having a family history of breast cancer in multiple relatives or at a young age, your doctor may recommend different screening for you.  
· Ages 21 to 44: Some experts recommend that women have a clinical breast exam every 3 years, starting at age 21. Ask your doctor how often you should have this test. If you have a high risk for breast cancer, talk with your doctor about when to start yearly mammograms and other screening tests. · Ages 36 and older: Talk with your doctor about how often you should have mammograms and clinical breast exams. What is your risk for breast cancer? If you don't already know your risk of breast cancer, you can ask your doctor about it. You can also look it up at www.cancer.gov/bcrisktool/. If your doctor says that you have a high or very high risk, ask about ways to reduce your risk. These could include getting extra screening, taking medicine, or having surgery. If you have a strong family history of breast cancer, ask your doctor about genetic testing. What steps can you take to stay healthy? Some things that increase your risk of breast cancer, such as your age and being female, cannot be controlled. But you can do some things to stay as healthy as you can. · Learn what your breasts normally look and feel like. If you notice any changes, tell your doctor. · Drink alcohol wisely. Your risk goes up the more you drink. For the best health, women should have no more than 1 drink a day or 7 drinks a week. · If you smoke, quit. When you quit smoking, you lower your chances of getting many types of cancer. You can also do your best to eat well, be active, and stay at a healthy weight. Eating healthy foods and being active every day, as well as staying at a healthy weight, may help prevent cancer. Where can you learn more? Go to http://margo-niyah.info/. Enter H758 in the search box to learn more about \"Learning About Breast Cancer Screening. \" Current as of: March 28, 2018 Content Version: 11.8 © 6225-6685 [employer]wise, [employer].  Care instructions adapted under license by [employer] (which disclaims liability or warranty for this information). If you have questions about a medical condition or this instruction, always ask your healthcare professional. [address] any warranty or liability for your use of this information. Osteoporosis: Care Instructions Your Care Instructions Osteoporosis causes bones to become thin and weak. It is much more common in women than in men. Osteoporosis may be very advanced before you know you have it. Sometimes the first sign is a broken bone in the hip, spine, or wrist or sudden pain in your middle or lower back. Follow-up care is a key part of your treatment and safety. Be sure to make and go to all appointments, and call your doctor if you are having problems. It's also a good idea to know your test results and keep a list of the medicines you take. How can you care for yourself at home? · Your doctor may prescribe a bisphosphonate, such as risedronate (Actonel) or alendronate (Fosamax), for osteoporosis. If you are taking one of these medicines by mouth: 
? Take your medicine with a full glass of water when you first get up in the morning. ? Do not lie down, eat, drink a beverage, or take any other medicine for at least 30 minutes after taking the drug. This helps prevent stomach problems. ? Do not take your medicine late in the day if you forgot to take it in the morning. Skip it, and take the usual dose the next morning. ? If you have side effects, tell your doctor. He or she may prescribe another medicine. · Get enough calcium and vitamin D. The [place] of Medicine recommends adults younger than age 46 need 1,000 mg of calcium and 600 IU of vitamin D each day. Women ages 46 to 79 need 1,200 mg of calcium and 600 IU of vitamin D each day. Men ages 46 to 79 need 1,000 mg of calcium and 600 IU of vitamin D each day. Adults 71 and older need 1,200 mg of calcium and 800 IU of vitamin D each day. ? Eat foods rich in calcium, like yogurt, cheese, milk, and dark green vegetables. This is a good way to get the calcium you need. You can get vitamin D from eggs, fatty fish, cereal, and milk. ? Talk to your doctor about taking a calcium plus vitamin D supplement. Be careful, though. Adults ages 23 to 48 should not get more than 2,500 mg of calcium and 4,000 IU of vitamin D each day, whether it is from supplements and/or food. Adults ages 46 and older should not get more than 2,000 mg of calcium and 4,000 IU of vitamin D each day from supplements and/or food. · Limit alcohol to 2 drinks a day for men and 1 drink a day for women. Too much alcohol can cause health problems. · Do not smoke. Smoking puts you at a much higher risk for osteoporosis. If you need help quitting, talk to your doctor about stop-smoking programs and medicines. These can increase your chances of quitting for good. · Get regular bone-building exercise. Weight-bearing and resistance exercises keep bones healthy by working the muscles and bones against gravity. Start out at an exercise level that feels right for you. Add a little at a time until you can do the following: ? Do 30 minutes of weight-bearing exercise on most days of the week. Walking, jogging, stair climbing, and dancing are good choices. ? Do resistance exercises with weights or elastic bands 2 to 3 days a week. · Reduce your risk of falls: 
? Wear supportive shoes with low heels and nonslip soles. ? Use a cane or walker, if you need it. Use shower chairs and bath benches. Put in handrails on stairways, around your shower or tub area, and near the toilet. ? Keep stairs, porches, and walkways well lit. Use night-lights. ? Remove throw rugs and other objects that are in the way. ? Avoid icy, wet, or slippery surfaces. ? Keep a cordless phone and a flashlight with new batteries by your bed. When should you call for help? Watch closely for changes in your health, and be sure to contact your doctor if you have any problems. Where can you learn more? Go to http://margo-niyah.info/. Enter K100 in the search box to learn more about \"Osteoporosis: Care Instructions. \" Current as of: November 7, 2018 Content Version: 12.2 © 8037-6264 [employer]. Care instructions adapted under license by [employer] (which disclaims liability or warranty for this information). If you have questions about a medical condition or this instruction, always ask your healthcare professional. [address] any warranty or liability for your use of this information. Please provide a copy of your: 
Advance Care Planning documents Cholesterol test

## 2019-12-03 NOTE — PROGRESS NOTES
Asia Mixon is a 75 y.o. female who was seen in clinic today (12/3/2019). Assessment & Plan:   Diagnoses and all orders for this visit:    1. Other iron deficiency anemia  -     CBC WITH AUTOMATED DIFF; Future  -     FERRITIN; Future  -     IRON PROFILE; Future    2. History of GI bleed  -     CBC WITH AUTOMATED DIFF; Future  -     FERRITIN; Future  -     IRON PROFILE; Future        Low normal stores  Continue, reassess 6 months      Follow-up and Dispositions    · Return in about 6 months (around 6/3/2020) for iron deficiency follow up, non fasting labs 1 week prior. Subjective:   Asia Mixon was seen today for Weight Management and Anemia    Follow-up iron deficiency anemia, replacement via herbal supplements; colo had showed potential non malignant bleeding source in the cecum. No follow up with GI scheduled.     No clear explanation for prior constipation or discomfort with eating, but now resolved/controlled with herbs in addition to the high iron herbs she's also consuming    Weight back to baseline  Energy restored        Results for orders placed or performed during the hospital encounter of 11/26/19   CBC WITH AUTOMATED DIFF   Result Value Ref Range    WBC 4.5 (L) 4.6 - 13.2 K/uL    RBC 4.24 4.20 - 5.30 M/uL    HGB 12.6 12.0 - 16.0 g/dL    HCT 39.3 35.0 - 45.0 %    MCV 92.7 74.0 - 97.0 FL    MCH 29.7 24.0 - 34.0 PG    MCHC 32.1 31.0 - 37.0 g/dL    RDW 12.5 11.6 - 14.5 %    PLATELET 187 218 - 624 K/uL    MPV 12.0 (H) 9.2 - 11.8 FL    NEUTROPHILS 55 40 - 73 %    LYMPHOCYTES 37 21 - 52 %    MONOCYTES 7 3 - 10 %    EOSINOPHILS 1 0 - 5 %    BASOPHILS 0 0 - 2 %    ABS. NEUTROPHILS 2.5 1.8 - 8.0 K/UL    ABS. LYMPHOCYTES 1.7 0.9 - 3.6 K/UL    ABS. MONOCYTES 0.3 0.05 - 1.2 K/UL    ABS. EOSINOPHILS 0.0 0.0 - 0.4 K/UL    ABS.  BASOPHILS 0.0 0.0 - 0.1 K/UL    DF AUTOMATED     FERRITIN   Result Value Ref Range    Ferritin 48 8 - 388 NG/ML   IRON PROFILE   Result Value Ref Range    Iron 79 50 - 175 ug/dL    TIBC 295 250 - 450 ug/dL    Iron % saturation 27 %        Prior to Admission medications    Medication Sig Start Date End Date Taking? Authorizing Provider   OTHER Indications: Homemade herbal tea   Yes Provider, Historical   BURDOCK ROOT PO Take  by mouth. Yes Provider, Historical   OTHER Indications: [place]   Yes Provider, Historical   elderberry fruit (ELDERBERRY PO) Take  by mouth. Yes Provider, Historical   OTHER Indications: Mania Yan krishnan   Yes Provider, Historical   OTHER Indications: Dandelion root   Yes Provider, Historical   OTHER Indications: Licorice   Yes Provider, Historical   OTHER Take 1 Packet by mouth daily. Indications: Powered Amalaki Fruit  12/3/19  Provider, Historical   OTHER Take 2 Caps by mouth daily. Indications: Liver Detox Capsule  12/3/19  Provider, Historical   OTHER Indications: Faby Amalaki  12/3/19  Provider, Historical   OTHER Indications: [place]  12/3/19  Provider, Historical   OTHER Indications: Joen Schlatter  12/3/19  Provider, Historical   OTHER Indications: Darnella Newer  12/3/19  Provider, Historical   OTHER Indications: [place] Ferrera  12/3/19  Provider, Historical   OTHER Indications: Walter Arbour  12/3/19  Provider, Historical   OTHER Indications: Neem  12/3/19  Provider, Historical   OTHER Indications: Evfabianen Loma  12/3/19  Provider, Historical   OTHER Indications: Mahasudarobby Churna  12/3/19  Provider, Historical   OTHER Take 3 Packets by mouth after meals as needed. Indications: Avipattikar Powder  12/3/19  Provider, Historical   ginger root (GINGER EXTRACT PO) Take  by mouth.   12/3/19  Provider, Historical   OTHER Indications: Piper nigrum  12/3/19  Provider, Historical   OTHER Indications: Piper Longum  12/3/19  Provider, Historical   OTHER Indications: Haritaki Fruit  12/3/19  Provider, Historical   OTHER Indications: Bibhitaki Fruit  12/3/19  Provider, Historical   OTHER Indications: Amalaki  12/3/19  Provider, Historical   OTHER Indications: Musta Rizome  12/3/19 Provider, Historical   OTHER Indications: Becky Seed  12/3/19  Provider, Historical   ferrous sulfate 325 mg (65 mg iron) tablet Take 1 Tab by mouth Daily (before breakfast). 6/6/19   Herrera Vasquez MD         Patient Active Problem List    Diagnosis    Underweight    Absolute anemia         No Known Allergies     Social History     Tobacco Use    Smoking status: Never Smoker    Smokeless tobacco: Never Used   Substance Use Topics    Alcohol use: Not Currently     Frequency: Never       Patient Care Team:  Herrera Vasquez MD as PCP - General (Family Practice)  Herrera Vasquez MD as PCP - [facility]    The following sections were reviewed & updated as appropriate: PMH, PSH, FH, and SH. Review of Systems   Gastrointestinal: Negative for abdominal pain, blood in stool and melena. Objective:     Visit Vitals  /72   Pulse 66   Temp 98.1 °F (36.7 °C) (Oral)   Resp 14   Ht 5' 8\" (1.727 m)   Wt 124 lb 3.2 oz (56.3 kg)   BMI 18.88 kg/m²      Physical Exam  Constitutional:       General: She is not in acute distress. Appearance: Normal appearance. She is well-developed. HENT:      Head: Normocephalic and atraumatic. Pulmonary:      Effort: Pulmonary effort is normal.   Neurological:      Mental Status: She is alert and oriented to person, place, and time. Psychiatric:         Behavior: Behavior normal.         Thought Content: Thought content normal.         Judgment: Judgment normal.           Disclaimer: The patient understands our medical plan. Alternatives have been explained and offered. The risks, benefits and significant side effects of all medications have been reviewed. Anticipated time course and progression of condition reviewed. All questions have been addressed. She is encouraged to employ the information provided in the after visit summary, which was reviewed.       Where applicable, she is instructed to call the clinic if she has not been notified either by phone or through [address] with the results of her tests or with an appointment plan for any referrals within 1 week(s). No news is not good news; it's no news. The patient  is to call if her condition worsens or fails to improve or if significant side effects are experienced. Aspects of this note may have been generated using voice recognition software. Despite editing, there may be unrecognized errors.        Russel Lindo MD

## 2019-12-03 NOTE — PROGRESS NOTES
Patient here for follow up on her iron and weight management today. 1. Have you been to the ER, urgent care clinic since your last visit? Hospitalized since your last visit? No  2. Have you seen or consulted any other health care providers outside of the [address] [place] [address] since your last visit? Include any pap smears or colon screening. No      Medication reconciliation has been completed with patient. Care team discussed/updated as well as pharmacy. Health Maintenance Due   Topic Date Due    Hepatitis C Screening  1955    DTaP/Tdap/Td series (1 - Tdap) 05/24/1964    Shingrix Vaccine Age 50> (1 of 2) 05/24/2003    BREAST CANCER SCRN MAMMOGRAM  05/24/2003    GLAUCOMA SCREENING Q2Y  05/24/2018    Bone Densitometry (Dexa) Screening  05/24/2018    Pneumococcal 65+ years (1 of 1 - PPSV23) 05/24/2018    MEDICARE YEARLY EXAM  06/04/2019    Influenza Age 9 to Adult  08/01/2019       Health Maintenance reviewed - MWV today.

## 2019-12-09 ENCOUNTER — DOCUMENTATION ONLY (OUTPATIENT)
Dept: FAMILY MEDICINE CLINIC | Age: 64
End: 2019-12-09

## 2019-12-09 NOTE — ACP (ADVANCE CARE PLANNING)
====Advance Care Planning Invitation====    Patient was invited to begin or continue Advance Care Planning on this date and was provided an ACP information folder for review. Recommended appointment with a [employer]  facilitator for ACP conversation regarding advance directives. [x] Yes  [] No  Referral sent to [employer] ACP team member or Coordinator for follow-up    [] Yes  [x] No  Patient scheduled an appointment.        Site of Referral: [facility]

## 2020-01-02 ENCOUNTER — CLINICAL SUPPORT (OUTPATIENT)
Dept: FAMILY MEDICINE CLINIC | Age: 65
End: 2020-01-02

## 2020-01-02 DIAGNOSIS — Z23 ENCOUNTER FOR IMMUNIZATION: Primary | ICD-10-CM

## 2020-01-02 NOTE — PROGRESS NOTES
Patient here for her [id number], consent form give to and completed by patient, name and  verified Pnuemovax 23 administered IM to left deltoid, patient tolerated well.

## 2020-01-02 NOTE — PATIENT INSTRUCTIONS
Pneumococcal Polyvalent Vaccine (Pneumovax 23) - (By injection)   Why this medicine is used:   Prevents severe infections, such as pneumonia and meningitis. Contact a nurse or doctor right away if you have:  · Itching or hives, swelling in your face or hands, swelling or tingling in your mouth or throat, chest tightness, trouble breathing  · High fever     Common side effects:  · Headache  · Muscle or joint pain  · Pain, redness, swelling, or tenderness where the shot was given  · Tiredness or weakness  © 2017 [facility] Information is for End User's use only and may not be sold, redistributed or otherwise used for commercial purposes.

## 2020-01-08 ENCOUNTER — CLINICAL SUPPORT (OUTPATIENT)
Dept: FAMILY MEDICINE CLINIC | Age: 65
End: 2020-01-08

## 2020-01-08 DIAGNOSIS — Z71.89 ADVANCE CARE PLANNING: Primary | ICD-10-CM

## 2020-01-08 NOTE — ACP (ADVANCE CARE PLANNING)
====[employer] Advance Care Planning Conversation====     Prior to today's conversation, did individual have existing ACP documents? [x] Yes  [] No  If Yes, type of document: Patient has a living will that was not completed yet. Patient decided to move forward with completing an AMD and will share this with her  to complete her living will as well. Date of conversation: 1/8/20  Location: [facility]    Participants: (in person)   [x] Patient    [x] Prospective agent (not yet named in a document)    Name: Luke White    Relationship to patient: spouse      Individual's Fears/Concerns about planning: Patient has fears her wishes will not be granted by healthcare workers. Goals/Narrative of Conversation: Patient was recently  so she is \"getting all affairs in order\". Acknowledges the importance of an AMD in future planning. Reports she has never been involved with end of life care with anyone. States her step mother was on hospice for 18 months. She was not present for any of her care but states that she felt her dying was drawn out too long. 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 The  had Parkinson's and the wife had many chronic conditions. She cared for them for 2 years, until they required professional care. Conversation topics for Individual    Has learned the following from prior experiences with serious illness: \"Everything is individualized\", meaning everyone approaches/rain with illness differently, so the care they receive should be individualized, catering specifically to their needs. Identifies the following as important for living well: Able to cook own food, shop for herself, garden, exercise, visit with friends and family. Have the choice to take care of her own health. \"What personal beliefs (if any) do you have that might help you choose the care you want, or do not want? \"  Patient response: Believe in the importance of hydrating the body even at end of life for comfort. Don't want to live on machines. Conversation topics for Agent / [address] agent's understanding of the role: \"To act as a tool, to carry out Elisabeth's wishes if she is not able to convey them\".     Agent confirms Willingness to:   [x]Yes []No Accept role   [x] Yes []No Talk with individual about his/her goals, values, & preferences   [x] Yes [] No   Follow individual's decisions, even if do not agree with the decisions   [x]Yes  []No Make decisions in difficult moments    CPR-related information for ALL patients    [x] Yes  [] No   Educated patient regarding differences between AMD and DDNR      Questions Only for Individuals with Chronic Illness:  [x] N/A      [employer] ACP Facilitator: Beverly Alejandra RN    Length (minutes): 90      The following was provided (check all that apply):     [x] Written information on the planning process        Healthcare Decision Information Cards:   [x] Help with Breathing Facts   [x] Tube Feeding Facts   [x] CPR Facts      [x] Review of advance directive form   [] Review of existing advance directive       Meeting Outcomes:   [x] ACP discussion completed   [x] Advance directive form completed   [] Review & validation of existing AMD completed   [x] Original of completed advance directive given to patient   [x] Copy given to/for healthcare agent / others   [x] Copy scanned to electronic medical record    Follow-up plan:     [] Schedule follow-up conversation to continue planning   [] Referred individual to provider for additional questions/concerns    [] Individual will invite agent/prospective agent to next ACP appointment   [x] Recommended to review completed AMD annually or upon change in health status     [x] This note routed to one or more involved healthcare providers

## 2020-01-22 ENCOUNTER — HOSPITAL ENCOUNTER (OUTPATIENT)
Dept: BONE DENSITY | Age: 65
Discharge: HOME | End: 2020-01-22
Attending: FAMILY MEDICINE
Payer: MEDICARE

## 2020-01-22 DIAGNOSIS — Z78.0 POSTMENOPAUSAL STATE: ICD-10-CM

## 2020-01-22 PROCEDURE — 77080 DXA BONE DENSITY AXIAL: CPT

## 2020-01-24 PROBLEM — M81.0 AGE-RELATED OSTEOPOROSIS WITHOUT CURRENT PATHOLOGICAL FRACTURE: Status: ACTIVE | Noted: 2020-01-24

## 2020-01-24 NOTE — PROGRESS NOTES
Gaudencio Mei, please schedule (30). \"Elisabeth, your bone density demonstrates osteoporosis. Please come see me to discuss options to reduce your risk of a painful or disabling fracture. \" Natalio Ledezma

## 2020-01-24 NOTE — PROGRESS NOTES
Called patient had her verify her  asked if she was aware of her results of her bone density scan she had not seen Dr. Stone Norberto message, she was given her results and told Dr. Michel Clements wants her to come in to discuss treatment options.  She has been scheduled to be seen on 2020 at 1:00 PM.

## 2020-02-11 ENCOUNTER — HOSPITAL ENCOUNTER (OUTPATIENT)
Dept: LAB | Age: 65
Discharge: HOME | End: 2020-02-11
Payer: MEDICARE

## 2020-02-11 ENCOUNTER — OFFICE VISIT (OUTPATIENT)
Dept: FAMILY MEDICINE CLINIC | Age: 65
End: 2020-02-11

## 2020-02-11 VITALS
TEMPERATURE: 98.1 F | HEART RATE: 66 BPM | WEIGHT: 125 LBS | SYSTOLIC BLOOD PRESSURE: 96 MMHG | RESPIRATION RATE: 10 BRPM | OXYGEN SATURATION: 98 % | HEIGHT: 68 IN | DIASTOLIC BLOOD PRESSURE: 60 MMHG | BODY MASS INDEX: 18.94 KG/M2

## 2020-02-11 DIAGNOSIS — R68.89 OTHER GENERAL SYMPTOMS AND SIGNS: ICD-10-CM

## 2020-02-11 DIAGNOSIS — M81.0 AGE-RELATED OSTEOPOROSIS WITHOUT CURRENT PATHOLOGICAL FRACTURE: ICD-10-CM

## 2020-02-11 DIAGNOSIS — M81.0 AGE-RELATED OSTEOPOROSIS WITHOUT CURRENT PATHOLOGICAL FRACTURE: Primary | ICD-10-CM

## 2020-02-11 DIAGNOSIS — E55.9 VITAMIN D DEFICIENCY: ICD-10-CM

## 2020-02-11 DIAGNOSIS — D50.8 OTHER IRON DEFICIENCY ANEMIA: ICD-10-CM

## 2020-02-11 DIAGNOSIS — Z13.220 SCREENING, LIPID: ICD-10-CM

## 2020-02-11 LAB
25(OH)D3 SERPL-MCNC: 16.8 NG/ML (ref 30–100)
BASOPHILS # BLD: 0 K/UL (ref 0–0.1)
BASOPHILS NFR BLD: 0 % (ref 0–3)
DIFFERENTIAL METHOD BLD: ABNORMAL
EOSINOPHIL # BLD: 0 K/UL (ref 0–0.4)
EOSINOPHIL NFR BLD: 1 % (ref 0–5)
ERYTHROCYTE [DISTWIDTH] IN BLOOD BY AUTOMATED COUNT: 12.6 % (ref 11.6–14.5)
FERRITIN SERPL-MCNC: 44 NG/ML (ref 8–388)
HCT VFR BLD AUTO: 38 % (ref 35–45)
HGB BLD-MCNC: 12.9 G/DL (ref 12–16)
IRON SATN MFR SERPL: 39 % (ref 20–50)
IRON SERPL-MCNC: 121 UG/DL (ref 50–175)
LYMPHOCYTES # BLD: 2 K/UL (ref 0.8–3.5)
LYMPHOCYTES NFR BLD: 42 % (ref 20–51)
MCH RBC QN AUTO: 30 PG (ref 24–34)
MCHC RBC AUTO-ENTMCNC: 33.9 G/DL (ref 31–37)
MCV RBC AUTO: 88.4 FL (ref 74–97)
MONOCYTES # BLD: 0.1 K/UL (ref 0–1)
MONOCYTES NFR BLD: 3 % (ref 2–9)
NEUTS BAND NFR BLD MANUAL: 1 % (ref 0–5)
NEUTS SEG # BLD: 2.5 K/UL (ref 1.8–8)
NEUTS SEG NFR BLD: 53 % (ref 42–75)
PLATELET # BLD AUTO: 155 K/UL (ref 135–420)
PLATELET COMMENTS,PCOM: ABNORMAL
PMV BLD AUTO: 12.8 FL (ref 9.2–11.8)
RBC # BLD AUTO: 4.3 M/UL (ref 4.2–5.3)
RBC MORPH BLD: ABNORMAL
TIBC SERPL-MCNC: 314 UG/DL (ref 250–450)
TSH SERPL-ACNC: 1.4 UIU/ML (ref 0.36–3.74)
WBC # BLD AUTO: 4.8 K/UL (ref 4.6–13.2)

## 2020-02-11 PROCEDURE — 82728 ASSAY OF FERRITIN: CPT

## 2020-02-11 PROCEDURE — 82306 VITAMIN D 25 HYDROXY: CPT

## 2020-02-11 PROCEDURE — 80061 LIPID PANEL: CPT

## 2020-02-11 PROCEDURE — 84443 ASSAY THYROID STIM HORMONE: CPT

## 2020-02-11 PROCEDURE — 83540 ASSAY OF IRON: CPT

## 2020-02-11 PROCEDURE — 85025 COMPLETE CBC W/AUTO DIFF WBC: CPT

## 2020-02-11 RX ORDER — IBANDRONATE SODIUM 150 MG/1
150 TABLET, FILM COATED ORAL
Qty: 3 TAB | Refills: 4 | Status: SHIPPED | OUTPATIENT
Start: 2020-02-11 | End: 2020-12-08 | Stop reason: ALTCHOICE

## 2020-02-11 NOTE — PROGRESS NOTES
Ashley Rader is a 75 y.o. female who was seen in clinic today (2/11/2020). Assessment & Plan:   Diagnoses and all orders for this visit:    1. Age-related osteoporosis without current pathological fracture  -     VITAMIN D, 25 HYDROXY; Future  -     ibandronate (BONIVA) 150 mg tablet; Take 150 mg by mouth every thirty (30) days.  -     TSH W/ REFLX FREE T4 IF ABNORMAL; Future    2. Other general symptoms and signs   -     TSH W/ REFLX FREE T4 IF ABNORMAL; Future    3. Other iron deficiency anemia  -     CBC WITH AUTOMATED DIFF; Future  -     FERRITIN; Future  -     IRON PROFILE; Future    4. Screening, lipid  -     LIPID PANEL W/ REFLX DIRECT LDL; Future      correcting/preventing vit D deficiency, adding Ca supplement  continue weight bearing exercise such as walking for 30 minutes most or all days of the week. Routine dental prev care visits    discussed the evidence for bisphosphonates as well as side effects of GI distress and potential for pathologic fracture should therapy continue beyond 5 years. Follow-up and Dispositions    · Return in about 1 year (around 2/11/2021) for osteoporosis follow up, results via [employer] with plan to follow. Subjective:   Ashley Rader was seen today for Follow-up    Osteoporosis: new    No excess alcohol, no tob  Regular weight bearing exercise  No dental disease    DEXA Results (most recent):  Results from Hospital Encounter encounter on 01/22/20   DEXA BONE DENSITY STUDY AXIAL    Narrative DEXA BONE DENSITOMETRY, CENTRAL    CLINICAL INDICATION/HISTORY: Post menopausal. 76-year-old female for baseline  study. TECHNIQUE: Using GE LUNAR Prodigy densitometer, bone density measurement was  performed in the lumbar spine, the proximal left and right femora. T Score  refers to standard deviations above or below average compared to a young adult  of the same sex.   Z Score refers to standard deviations above or below average  compared to a patient of the same sex, age, race and weight. COMPARISON: None available. FINDINGS:     Lumbar Spine Levels: L1-L4  Mean Bone Mineral Density (BMD):  0.787 g/cm2    T Score: -3.3  Z Score: -1.4    Left Total Proximal Femur BMD: 0.729 g/cm2    T Score: -2.2   Z Score: -0.7     Right Total Proximal Femur BMD: 0.718 g/cm2   T Score: -2.3    Z Score: -0.8     Left Femoral Neck BMD: 0.660  g/cm2    T Score: -2.7  Z Score: -1.0    Right Femoral Neck BMD: 0.756  g/cm2    T Score: -2.0  Z Score: -0.3       Impression IMPRESSION:    BMD measures consistent with osteoporosis. Based upon current ISCD guidelines, the patient's overall diagnostic category,  selected using WHO criteria in postmenopausal women and males aged 48 and above,  is selected based upon the lowest T Score from among the lumbar spine, total  femur, femoral neck, (or distal third radius if measured). WHO Definition of Osteoporosis and Osteopenia on DXA (specified for post  menopausal  females):      Normal:                     T Score at or above -1 SD    Osteopenia:              T Score between -1 and -2.5 SD    Osteoporosis:          T Score at or below -2.5 SD    The risk of fracture approximately doubles for each 1 SD decrease in T Score. It is important to consider other factors in assessing a patient's risk of  fracture, including age, risk of falling/injury, history of fragility fracture,  family history of osteoporosis, smoking, low weight. Various fracture risk tools have been developed for adult patients and are  available online. For example, the FRAX tool developed by [facility] is widely used. Reference www.iscd.org. It is also important to note that DXA measures bone density but does not  distinguish among causes of decreased bone density, which include primary versus  secondary osteoporosis (such as metabolic bone disorders or possible effects of  medications) and also other conditions (such as osteomalacia). Clinical  considerations should determine what additional evaluation may be warranted to  exclude secondary conditions in a patient with low bone density. Please note that reliable, valid comparisons can not be made between studies  which have been performed on different densitometers. If clinically warranted,  follow up study performed at this site would best permit assessment of trend for  possible change in bone mineral density over time in comparison to this study. Thank you for this referral.     No results found for: TSH, TSH2, TSH3, TSHP, TSHEXT  No results found for: VITD3   No results found for: CHOL, CHOLPOCT, CHOLX, CHLST, CHOLV, HDL, HDLPOC, HDLP, LDL, LDLCPOC, LDLC, DLDLP, VLDLC, VLDL, TGLX, TRIGL, TRIGP, TGLPOCT, CHHD, CHHDX      Lab Results   Component Value Date/Time    Sodium 142 06/04/2019 03:46 PM    Potassium 4.8 06/04/2019 03:46 PM    Chloride 107 06/04/2019 03:46 PM    CO2 30 06/04/2019 03:46 PM    Anion gap 5 06/04/2019 03:46 PM    Glucose 82 06/04/2019 03:46 PM    BUN 13 06/04/2019 03:46 PM    Creatinine 0.66 06/04/2019 03:46 PM    BUN/Creatinine ratio 20 06/04/2019 03:46 PM    GFR est AA >60 06/04/2019 03:46 PM    GFR est non-AA >60 06/04/2019 03:46 PM    Calcium 8.5 06/04/2019 03:46 PM     Follow up iron deficiency anemia: on herbal replacement       Prior to Admission medications    Medication Sig Start Date End Date Taking? Authorizing Provider   OTHER Indications: Homemade herbal tea   Yes Provider, Historical   BURDOCK ROOT PO Take  by mouth. Yes Provider, Historical   OTHER Indications: Yellow Dock   Yes Provider, Historical   elderberry fruit (ELDERBERRY PO) Take  by mouth. Yes Provider, Historical   OTHER Indications: Lavella How thorn   Yes Provider, Historical   OTHER Indications: Dandelion root   Yes Provider, Historical   OTHER Indications: Licorice   Yes Provider, Historical   ferrous sulfate 325 mg (65 mg iron) tablet Take 1 Tab by mouth Daily (before breakfast).  6/6/19 Sandor Ziegler MD         Patient Active Problem List    Diagnosis    Age-related osteoporosis without current pathological fracture     DEXA 1/23/2020           No Known Allergies     Social History     Tobacco Use    Smoking status: Never Smoker    Smokeless tobacco: Never Used   Substance Use Topics    Alcohol use: Not Currently     Frequency: Never       Patient Care Team:  Sandor Ziegler MD as PCP - General (Family Practice)  Sandor Ziegler MD as PCP - [facility]    The following sections were reviewed & updated as appropriate: PMH, PSH, FH, and SH. Review of Systems   Gastrointestinal: Negative for abdominal pain. Musculoskeletal: Negative for joint pain. Objective:     Visit Vitals  BP 96/60   Pulse 66   Temp 98.1 °F (36.7 °C) (Oral)   Resp 10   Ht 5' 8\" (1.727 m)   Wt 125 lb (56.7 kg)   SpO2 98%   BMI 19.01 kg/m²      Physical Exam  Constitutional:       General: She is not in acute distress. Appearance: Normal appearance. She is well-developed. HENT:      Head: Normocephalic and atraumatic. Pulmonary:      Effort: Pulmonary effort is normal.   Neurological:      Mental Status: She is alert and oriented to person, place, and time. Psychiatric:         Behavior: Behavior normal.         Thought Content: Thought content normal.         Judgment: Judgment normal.           Disclaimer: The patient understands our medical plan. Alternatives have been explained and offered. The risks, benefits and significant side effects of all medications have been reviewed. Anticipated time course and progression of condition reviewed. All questions have been addressed. She is encouraged to employ the information provided in the after visit summary, which was reviewed.       Where applicable, she is instructed to call the clinic if she has not been notified either by phone or through [address] with the results of her tests or with an appointment plan for any referrals within 1 week(s). No news is not good news; it's no news. The patient  is to call if her condition worsens or fails to improve or if significant side effects are experienced. Aspects of this note may have been generated using voice recognition software. Despite editing, there may be unrecognized errors.        Kae Hackett MD

## 2020-02-11 NOTE — PATIENT INSTRUCTIONS
Calcium supplement 1500 mg daily  Vitamin D3 2000 IU daily gelcaps         Deciding About Bisphosphonate Medicine for Osteoporosis  How can you decide about taking bisphosphonate medicine for osteoporosis? This information is right for you if you are a woman who has been through menopause. If that does not describe you, or if you're not sure, talk with your doctor about this decision. What is osteoporosis? Osteoporosis is a disease that affects your bones. It means you have bones that are thin and brittle and have lots of holes inside them like a sponge. This makes them easy to break. It also increases your risk for spine and hip fractures. These fractures may make it hard for you to live on your own. Bisphosphonates are the most common medicines used to prevent bone loss. Most of the time, you take them as pills. They slow the way bone dissolves and is absorbed by your body. They can increase bone thickness and strength. What are key points about this decision? · If you are at a higher risk of having a fracture, taking bisphosphonates is more likely to help you prevent a fracture. If your risk of a fracture is lower, it's less likely that these medicines will help you. · Bisphosphonates can cause problems with the jaw or thigh bone. But most women do not have these side effects. · Whether you take medicine or not, healthy habits can help protect your bones. Get enough calcium and vitamin D. Get regular weight-bearing exercise. Cut back on alcohol. And if you smoke, quit. Who is helped the most by bisphosphonates? For women who have been through menopause:  · If you have osteoporosis (your T-score is -2.5 or less) or you have had a fracture, taking bisphosphonates lowers your risk of having a fracture. · If you haven't had a fracture and you have low bone density (your T-score is between -1.0 and -2.5, sometimes called osteopenia), taking bisphosphonates might lower your risk of having a fracture.  This evidence is not as strong. What are the side effects of bisphosphonates? These medicines can have side effects, such as heartburn and belly pain. Certain bone problems have also been reported in women taking bisphosphonates. Out of 1,000 people, about 1 person has a bone side effect during a year of taking bisphosphonates. That means 999 out of 1,000 people do not have a bone side effect. These bone side effects include problems with the jaw bone (called osteonecrosis). They also might include a certain kind of fracture of the thigh bone (called an atypical fracture), but more research is needed to find out if taking bisphosphonates is a cause of these fractures. Your decision  Thinking about the facts and your feelings can help you make a decision that is right for you. Be sure you understand the benefits and risks of your options, and think about what else you need to do before you make the decision. Where can you learn more? Go to http://margo-niyah.info/. Enter B337 in the search box to learn more about \"Deciding About Bisphosphonate Medicine for Osteoporosis. \"  Current as of: November 7, 2018  Content Version: 12.2  © 3321-5827 [employer]. Care instructions adapted under license by [employer] (which disclaims liability or warranty for this information). If you have questions about a medical condition or this instruction, always ask your healthcare professional. [place] [address] any warranty or liability for your use of this information. Osteoporosis: Care Instructions  Your Care Instructions    Osteoporosis causes bones to become thin and weak. It is much more common in women than in men. Osteoporosis may be very advanced before you know you have it. Sometimes the first sign is a broken bone in the hip, spine, or wrist or sudden pain in your middle or lower back. Follow-up care is a key part of your treatment and safety.  Be sure to make and go to all appointments, and call your doctor if you are having problems. It's also a good idea to know your test results and keep a list of the medicines you take. How can you care for yourself at home? · Your doctor may prescribe a bisphosphonate, such as risedronate (Actonel) or alendronate (Fosamax), for osteoporosis. If you are taking one of these medicines by mouth:  ? Take your medicine with a full glass of water when you first get up in the morning. ? Do not lie down, eat, drink a beverage, or take any other medicine for at least 30 minutes after taking the drug. This helps prevent stomach problems. ? Do not take your medicine late in the day if you forgot to take it in the morning. Skip it, and take the usual dose the next morning. ? If you have side effects, tell your doctor. He or she may prescribe another medicine. · Get enough calcium and vitamin D. The [place] of [employer] recommends adults younger than age 46 need 1,000 mg of calcium and 600 IU of vitamin D each day. Women ages 46 to 79 need 1,200 mg of calcium and 600 IU of vitamin D each day. Men ages 46 to 79 need 1,000 mg of calcium and 600 IU of vitamin D each day. Adults 71 and older need 1,200 mg of calcium and 800 IU of vitamin D each day. ? Eat foods rich in calcium, like yogurt, cheese, milk, and dark green vegetables. This is a good way to get the calcium you need. You can get vitamin D from eggs, fatty fish, cereal, and milk. ? Talk to your doctor about taking a calcium plus vitamin D supplement. Be careful, though. Adults ages 23 to 48 should not get more than 2,500 mg of calcium and 4,000 IU of vitamin D each day, whether it is from supplements and/or food. Adults ages 46 and older should not get more than 2,000 mg of calcium and 4,000 IU of vitamin D each day from supplements and/or food. · Limit alcohol to 2 drinks a day for men and 1 drink a day for women. Too much alcohol can cause health problems. · Do not smoke. Smoking puts you at a much higher risk for osteoporosis. If you need help quitting, talk to your doctor about stop-smoking programs and medicines. These can increase your chances of quitting for good. · Get regular bone-building exercise. Weight-bearing and resistance exercises keep bones healthy by working the muscles and bones against gravity. Start out at an exercise level that feels right for you. Add a little at a time until you can do the following:  ? Do 30 minutes of weight-bearing exercise on most days of the week. Walking, jogging, stair climbing, and dancing are good choices. ? Do resistance exercises with weights or elastic bands 2 to 3 days a week. · Reduce your risk of falls:  ? Wear supportive shoes with low heels and nonslip soles. ? Use a cane or walker, if you need it. Use shower chairs and bath benches. Put in handrails on stairways, around your shower or tub area, and near the toilet. ? Keep stairs, porches, and walkways well lit. Use night-lights. ? Remove throw rugs and other objects that are in the way. ? Avoid icy, wet, or slippery surfaces. ? Keep a cordless phone and a flashlight with new batteries by your bed. When should you call for help? Watch closely for changes in your health, and be sure to contact your doctor if you have any problems. Where can you learn more? Go to http://margo-niyah.info/. Enter K100 in the search box to learn more about \"Osteoporosis: Care Instructions. \"  Current as of: November 7, 2018  Content Version: 12.2  © 5733-1638 [employer]. Care instructions adapted under license by [employer] (which disclaims liability or warranty for this information). If you have questions about a medical condition or this instruction, always ask your healthcare professional. [address] any warranty or liability for your use of this information.

## 2020-02-11 NOTE — PROGRESS NOTES
Patient here to discuss her Dexa results. No concerns. 1. Have you been to the ER, urgent care clinic since your last visit? Hospitalized since your last visit? No  2. Have you seen or consulted any other health care providers outside of the [address] [place] [address] since your last visit? Include any pap smears or colon screening. No    Medication reconciliation has been completed with patient. Care team discussed/updated as well as pharmacy.     Health Maintenance Due   Topic Date Due    DTaP/Tdap/Td series (1 - Tdap) 05/24/1964    Shingrix Vaccine Age 50> (1 of 2) 05/24/2003    Breast Cancer Screen Mammogram  05/24/2003    GLAUCOMA SCREENING Q2Y  05/24/2018

## 2020-02-12 LAB
CHOLEST SERPL-MCNC: 248 MG/DL
HDLC SERPL-MCNC: 68 MG/DL (ref 40–60)
HDLC SERPL: 3.6 {RATIO} (ref 0–5)
LDLC SERPL CALC-MCNC: 154.8 MG/DL (ref 0–100)
LIPID PROFILE,FLP: ABNORMAL
TRIGL SERPL-MCNC: 126 MG/DL (ref ?–150)
VLDLC SERPL CALC-MCNC: 25.2 MG/DL

## 2020-02-13 NOTE — PROGRESS NOTES
Vitamin D is low, [place]. Needs greater replacement that I'd recommended at our visit. Look for Vit D3 5000 international units. Boniva still needed. We'll keep those appointments in May and June (labs, visit). Cholesterol is excellent, despite the \"flags. \" Iron stores and thyroid look good.  Take good care, FERN

## 2020-11-23 ENCOUNTER — TELEPHONE (OUTPATIENT)
Dept: FAMILY MEDICINE CLINIC | Age: 65
End: 2020-11-23

## 2020-11-23 NOTE — TELEPHONE ENCOUNTER
Will respect her wishes to defer labs for reeval ongoing conditions. Without monitoring, she should decrease her Vit D to 2000 daily. Medicare Wellness does not need labs and can still be conducted virtually. Assign/educate questionnaire. Thanks.   FERN

## 2020-11-23 NOTE — TELEPHONE ENCOUNTER
Ms. Fazal Car is calling wanting to know if her appointments are necessary right now. She doesn't want to come out to get labs with the covid. 12/3/20 has to be rescheduled anyway cause Dr. Kellee Rojas isn't work that day. Please contact patient when possible.

## 2020-12-08 ENCOUNTER — VIRTUAL VISIT (OUTPATIENT)
Dept: FAMILY MEDICINE CLINIC | Age: 65
End: 2020-12-08
Payer: MEDICARE

## 2020-12-08 DIAGNOSIS — Z86.39 HISTORY OF IRON DEFICIENCY: ICD-10-CM

## 2020-12-08 DIAGNOSIS — Z87.19 HISTORY OF GI BLEED: ICD-10-CM

## 2020-12-08 DIAGNOSIS — Z12.31 ENCOUNTER FOR SCREENING MAMMOGRAM FOR MALIGNANT NEOPLASM OF BREAST: ICD-10-CM

## 2020-12-08 DIAGNOSIS — E55.9 VITAMIN D DEFICIENCY: Primary | ICD-10-CM

## 2020-12-08 DIAGNOSIS — M81.0 AGE-RELATED OSTEOPOROSIS WITHOUT CURRENT PATHOLOGICAL FRACTURE: ICD-10-CM

## 2020-12-08 DIAGNOSIS — Z71.89 ADVANCED DIRECTIVES, COUNSELING/DISCUSSION: ICD-10-CM

## 2020-12-08 DIAGNOSIS — Z00.00 MEDICARE ANNUAL WELLNESS VISIT, SUBSEQUENT: ICD-10-CM

## 2020-12-08 PROCEDURE — G8536 NO DOC ELDER MAL SCRN: HCPCS | Performed by: FAMILY MEDICINE

## 2020-12-08 PROCEDURE — G8510 SCR DEP NEG, NO PLAN REQD: HCPCS | Performed by: FAMILY MEDICINE

## 2020-12-08 PROCEDURE — G0439 PPPS, SUBSEQ VISIT: HCPCS | Performed by: FAMILY MEDICINE

## 2020-12-08 PROCEDURE — 99213 OFFICE O/P EST LOW 20 MIN: CPT | Performed by: FAMILY MEDICINE

## 2020-12-08 PROCEDURE — G9899 SCRN MAM PERF RSLTS DOC: HCPCS | Performed by: FAMILY MEDICINE

## 2020-12-08 PROCEDURE — 99497 ADVNCD CARE PLAN 30 MIN: CPT | Performed by: FAMILY MEDICINE

## 2020-12-08 PROCEDURE — 3017F COLORECTAL CA SCREEN DOC REV: CPT | Performed by: FAMILY MEDICINE

## 2020-12-08 PROCEDURE — 1101F PT FALLS ASSESS-DOCD LE1/YR: CPT | Performed by: FAMILY MEDICINE

## 2020-12-08 PROCEDURE — 1090F PRES/ABSN URINE INCON ASSESS: CPT | Performed by: FAMILY MEDICINE

## 2020-12-08 PROCEDURE — G8420 CALC BMI NORM PARAMETERS: HCPCS | Performed by: FAMILY MEDICINE

## 2020-12-08 PROCEDURE — G8427 DOCREV CUR MEDS BY ELIG CLIN: HCPCS | Performed by: FAMILY MEDICINE

## 2020-12-08 NOTE — PATIENT INSTRUCTIONS
Medicare Wellness Visit, Female     The best way to live healthy is to have a lifestyle where you eat a well-balanced diet, exercise regularly, limit alcohol use, and quit all forms of tobacco/nicotine, if applicable. Regular preventive services are another way to keep healthy. Preventive services (vaccines, screening tests, monitoring & exams) can help personalize your care plan, which helps you manage your own care. Screening tests can find health problems at the earliest stages, when they are easiest to treat. [facility] follows the current, evidence-based guidelines published by the [facility] ([facility]) when recommending preventive services for our patients. Because we follow these guidelines, sometimes recommendations change over time as research supports it. (For example, mammograms used to be recommended annually. Even though Medicare will still pay for an annual mammogram, the newer guidelines recommend a mammogram every two years for women of average risk). Of course, you and your doctor may decide to screen more often for some diseases, based on your risk and your co-morbidities (chronic disease you are already diagnosed with). Preventive services for you include:  - Medicare offers their members a free annual wellness visit, which is time for you and your primary care provider to discuss and plan for your preventive service needs. Take advantage of this benefit every year!  -All adults over the age of 72 should receive the recommended pneumonia vaccines. Current USPSTF guidelines recommend a series of two vaccines for the best pneumonia protection.   -All adults should have a flu vaccine yearly and a tetanus vaccine every 10 years.   -All adults age 48 and older should receive the shingles vaccines (series of two vaccines).       -All adults age 38-68 who are overweight should have a diabetes screening test once every three years.   -All adults born between 80 and 1965 should be screened once for Hepatitis C.  -Other screening tests and preventive services for persons with diabetes include: an eye exam to screen for diabetic retinopathy, a kidney function test, a foot exam, and stricter control over your cholesterol.   -Cardiovascular screening for adults with routine risk involves an electrocardiogram (ECG) at intervals determined by your doctor.   -Colorectal cancer screenings should be done for adults age 54-65 with no increased risk factors for colorectal cancer. There are a number of acceptable methods of screening for this type of cancer. Each test has its own benefits and drawbacks. Discuss with your doctor what is most appropriate for you during your annual wellness visit. The different tests include: colonoscopy (considered the best screening method), a fecal occult blood test, a fecal DNA test, and sigmoidoscopy.    -A bone mass density test is recommended when a woman turns 65 to screen for osteoporosis. This test is only recommended one time, as a screening. Some providers will use this same test as a disease monitoring tool if you already have osteoporosis. -Breast cancer screenings are recommended every other year for women of normal risk, age 54-69.  -Cervical cancer screenings for women over age 72 are only recommended with certain risk factors.      Here is a list of your current Health Maintenance items (your personalized list of preventive services) with a due date:  Health Maintenance Due   Topic Date Due    DTaP/Tdap/Td  (1 - Tdap) 05/24/1974    Shingles Vaccine (1 of 2) 05/24/2003    Mammogram  05/24/2003    Glaucoma Screening   05/24/2018    Yearly Flu Vaccine (1) 09/01/2020    Annual Well Visit  12/03/2020         Medicare Wellness Visit, Female     The best way to live healthy is to have a lifestyle where you eat a well-balanced diet, exercise regularly, limit alcohol use, and quit all forms of tobacco/nicotine, if applicable. Regular preventive services are another way to keep healthy. Preventive services (vaccines, screening tests, monitoring & exams) can help personalize your care plan, which helps you manage your own care. Screening tests can find health problems at the earliest stages, when they are easiest to treat. [facility] follows the current, evidence-based guidelines published by the [facility] ([facility]) when recommending preventive services for our patients. Because we follow these guidelines, sometimes recommendations change over time as research supports it. (For example, mammograms used to be recommended annually. Even though Medicare will still pay for an annual mammogram, the newer guidelines recommend a mammogram every two years for women of average risk). Of course, you and your doctor may decide to screen more often for some diseases, based on your risk and your co-morbidities (chronic disease you are already diagnosed with). Preventive services for you include:  - Medicare offers their members a free annual wellness visit, which is time for you and your primary care provider to discuss and plan for your preventive service needs. Take advantage of this benefit every year!  -All adults over the age of 72 should receive the recommended pneumonia vaccines. Current USPSTF guidelines recommend a series of two vaccines for the best pneumonia protection.   -All adults should have a flu vaccine yearly and a tetanus vaccine every 10 years.   -All adults age 48 and older should receive the shingles vaccines (series of two vaccines).       -All adults age 38-68 who are overweight should have a diabetes screening test once every three years.   -All adults born between 80 and 1965 should be screened once for Hepatitis C.  -Other screening tests and preventive services for persons with diabetes include: an eye exam to screen for diabetic retinopathy, a kidney function test, a foot exam, and stricter control over your cholesterol.   -Cardiovascular screening for adults with routine risk involves an electrocardiogram (ECG) at intervals determined by your doctor.   -Colorectal cancer screenings should be done for adults age 54-65 with no increased risk factors for colorectal cancer. There are a number of acceptable methods of screening for this type of cancer. Each test has its own benefits and drawbacks. Discuss with your doctor what is most appropriate for you during your annual wellness visit. The different tests include: colonoscopy (considered the best screening method), a fecal occult blood test, a fecal DNA test, and sigmoidoscopy.    -A bone mass density test is recommended when a woman turns 65 to screen for osteoporosis. This test is only recommended one time, as a screening. Some providers will use this same test as a disease monitoring tool if you already have osteoporosis. -Breast cancer screenings are recommended every other year for women of normal risk, age 54-69.  -Cervical cancer screenings for women over age 72 are only recommended with certain risk factors.      Here is a list of your current Health Maintenance items (your personalized list of preventive services) with a due date:  Health Maintenance Due   Topic Date Due    DTaP/Tdap/Td  (1 - Tdap) 05/24/1974    Shingles Vaccine (1 of 2) 05/24/2003    Mammogram  05/24/2003    Glaucoma Screening   05/24/2018    Yearly Flu Vaccine (1) 09/01/2020    Annual Well Visit  12/03/2020

## 2020-12-08 NOTE — PROGRESS NOTES
Patient here today for her [address], no concerns. 1. Have you been to the ER, urgent care clinic since your last visit? Hospitalized since your last visit? No  2. Have you seen or consulted any other health care providers outside of the [address] [place] [address] since your last visit? Include any pap smears or colon screening. Says she had cataract surgery bilateral in January    Medication reconciliation has been completed with patient. Care team discussed/updated as well as pharmacy. Health Maintenance Due   Topic Date Due    DTaP/Tdap/Td series (1 - Tdap) 05/24/1974    Shingrix Vaccine Age 50> (1 of 2) 05/24/2003    Breast Cancer Screen Mammogram  05/24/2003    GLAUCOMA SCREENING Q2Y  05/24/2018    Flu Vaccine (1) 09/01/2020    Medicare Yearly Exam  12/03/2020         Health Maintenance reviewed - Patient has not gotten flu vaccine and is planning to schedule here.

## 2020-12-08 NOTE — ACP (ADVANCE CARE PLANNING)
Advance Care Planning     General Advance Care Planning (ACP) Conversation      Date of Conversation: 12/8/2020  Conducted with: Patient with Decision Making Capacity    Healthcare Decision Maker:     Primary Decision Maker: Marisa Baker - Spouse - [phone]    Secondary Decision Maker: Minesh Sherwood - Friend - [phone]  Click here to complete [place] including selection of the Healthcare Decision Maker Relationship (ie \"Primary\")      Content/Action Overview: Has ACP document(s) on file - reflects the patient's care preferences  Reviewed DNR/DNI and patient elects Full Code (Attempt Resuscitation)    Care preferences as described would not be possible to honor in the inpatient setting during the pandemic. Discussed how they COULD be met at home - provided there were certainty that the condition was such that she would not survive whatever ailment existed, the chief concern being COVID. That said, she indicated she would not wish to pose an infection risk her to . \"How could you tell? \" (whether condition was survivable with treatment) Sometimes it's clear (devastating CVA).  Sometimes it's not: COVID before it's clear it's terminal.     Length of Voluntary ACP Conversation in minutes:  16 minutes    Donnell Perez MD

## 2020-12-08 NOTE — PROGRESS NOTES
This is the Subsequent Medicare Annual Wellness Exam, performed 12 months or more after the Initial AWV or the last Subsequent AWV    I have reviewed the patient's medical history in detail and updated the computerized patient record. Depression Risk Factor Screening:     3 most recent PHQ Screens 12/8/2020   Little interest or pleasure in doing things Not at all   Feeling down, depressed, irritable, or hopeless Not at all   Total Score PHQ 2 0   Trouble falling or staying asleep, or sleeping too much Not at all   Feeling tired or having little energy Not at all   Poor appetite, weight loss, or overeating Not at all   Feeling bad about yourself - or that you are a failure or have let yourself or your family down Not at all   Trouble concentrating on things such as school, work, reading, or watching TV Not at all   Moving or speaking so slowly that other people could have noticed; or the opposite being so fidgety that others notice Not at all   Thoughts of being better off dead, or hurting yourself in some way Not at all   PHQ 9 Score 0   How difficult have these problems made it for you to do your work, take care of your home and get along with others -       Alcohol Risk Screen   Do you average more than 1 drink per night or more than 7 drinks a week:  No    On any one occasion in the past three months have you have had more than 3 drinks containing alcohol:  No        Functional Ability and Level of Safety:   Hearing: Hearing is good. Activities of Daily Living: The home contains: no safety equipment. Patient does total self care     Ambulation: with no difficulty     Fall Risk:  Fall Risk Assessment, last 12 mths 12/8/2020   Able to walk? Yes   Fall in past 12 months? No     Abuse Screen:  Patient is not abused       Cognitive Screening   Has your family/caregiver stated any concerns about your memory: no    Cognitive Screening: Normal - obs      Separate service/issue(s) addressed same visit:  1.  Fu vit d def  2. Fu iron def anemia 2/2 GI bleed. Has battled gas and contipation since then, but no further lightheadedness or bleeding noted. Assessment/Plan   Education and counseling provided:  Patient has ACP and says she has bought copy to the office. Diagnoses and all orders for this visit:    1. Vitamin D deficiency  -     METABOLIC PANEL, COMPREHENSIVE; Future  -     VITAMIN D, 25 HYDROXY; Future    2. Age-related osteoporosis without current pathological fracture  -     METABOLIC PANEL, COMPREHENSIVE; Future  -     VITAMIN D, 25 HYDROXY; Future    3. History of iron deficiency  -     CBC WITH AUTOMATED DIFF; Future  -     FERRITIN; Future  -     IRON PROFILE; Future    4. History of GI bleed    5. Medicare annual wellness visit, subsequent    6. Advanced directives, counseling/discussion  -     ADVANCE CARE PLANNING FIRST 30 MINS    7. Encounter for screening mammogram for malignant neoplasm of breast  -     ERICKA MAMMO BI SCREENING INCL CAD; Future      Vit D def: continue present management pending review of labs  Osteoporosis: not currently on bisphosphonates as had been prescribed in Feb. Will address in follow up.  history of iron def 2/2 GI bleed: resolution of clinical symptoms. Labs to verify no new rebleed. Follow-up and Dispositions    · Return for flu shot, labs next week, results via [employer] with plan to follow.          Health Maintenance Due     Health Maintenance Due   Topic Date Due    DTaP/Tdap/Td series (1 - Tdap) 05/24/1974    Shingrix Vaccine Age 50> (1 of 2) 05/24/2003    Breast Cancer Screen Mammogram  05/24/2003    GLAUCOMA SCREENING Q2Y  05/24/2018    Flu Vaccine (1) 09/01/2020    Medicare Yearly Exam  12/03/2020     Schedule for flu shot next week      Patient Care Team   Patient Care Team:  Clayton Colon MD as PCP - General (Family Medicine)    History     Patient Active Problem List   Diagnosis Code    Age-related osteoporosis without current pathological fracture M81.0     History reviewed. No pertinent past medical history. Past Surgical History:   Procedure Laterality Date    HX GYN      Fibroidectomy x 2    HX HYSTERECTOMY  1998     Current Outpatient Medications   Medication Sig Dispense Refill    OTHER Indications: Homemade herbal tea      BURDOCK ROOT PO Take  by mouth.  OTHER Indications: Yellow Dock      elderberry fruit (ELDERBERRY PO) Take  by mouth.  OTHER Indications: [place] thorn      OTHER Indications: Dandelion root      OTHER Indications: Licorice       No Known Allergies    Family History   Problem Relation Age of Onset    Alcohol abuse Mother     Alcohol abuse Father     Bleeding Prob Father     Alcohol abuse Maternal Grandmother     Alcohol abuse Maternal Grandfather     Alcohol abuse Paternal Grandmother     Alcohol abuse Paternal Grandfather     Colon Cancer Neg Hx      Social History     Tobacco Use    Smoking status: Never Smoker    Smokeless tobacco: Never Used   Substance Use Topics    Alcohol use: Not Currently     Frequency: Never       Ganesh Liao, who was evaluated through a synchronous (real-time) audio-video encounter, and/or her healthcare decision maker, is aware that it is a billable service, with coverage as determined by her insurance carrier. She provided verbal consent to proceed: Yes, and patient identification was verified. It was conducted pursuant to the emergency declaration under the [facility][note 3][facility], [address] [place] [address] authority and the [employer] and [employer] General Act. A caregiver was present when appropriate. Ability to conduct physical exam was limited. I was at home. The patient was at home.     Kyle Swanson MD

## 2020-12-17 ENCOUNTER — IMMUNIZATION CLINIC (OUTPATIENT)
Dept: FAMILY MEDICINE CLINIC | Age: 65
End: 2020-12-17
Payer: MEDICARE

## 2020-12-17 ENCOUNTER — HOSPITAL ENCOUNTER (OUTPATIENT)
Dept: LAB | Age: 65
Discharge: HOME | End: 2020-12-17
Payer: MEDICARE

## 2020-12-17 DIAGNOSIS — M81.0 AGE-RELATED OSTEOPOROSIS WITHOUT CURRENT PATHOLOGICAL FRACTURE: ICD-10-CM

## 2020-12-17 DIAGNOSIS — Z86.39 HISTORY OF IRON DEFICIENCY: ICD-10-CM

## 2020-12-17 DIAGNOSIS — E55.9 VITAMIN D DEFICIENCY: ICD-10-CM

## 2020-12-17 DIAGNOSIS — Z23 ENCOUNTER FOR IMMUNIZATION: ICD-10-CM

## 2020-12-17 LAB
25(OH)D3 SERPL-MCNC: 41.2 NG/ML (ref 30–100)
ALBUMIN SERPL-MCNC: 4.1 G/DL (ref 3.4–5)
ALBUMIN/GLOB SERPL: 1.4 {RATIO} (ref 0.8–1.7)
ALP SERPL-CCNC: 55 U/L (ref 45–117)
ALT SERPL-CCNC: 26 U/L (ref 13–56)
ANION GAP SERPL CALC-SCNC: 4 MMOL/L (ref 3–18)
AST SERPL-CCNC: 18 U/L (ref 10–38)
BASOPHILS # BLD: 0 K/UL (ref 0–0.1)
BASOPHILS NFR BLD: 0 % (ref 0–2)
BILIRUB SERPL-MCNC: 0.6 MG/DL (ref 0.2–1)
BUN SERPL-MCNC: 15 MG/DL (ref 7–18)
BUN/CREAT SERPL: 17 (ref 12–20)
CALCIUM SERPL-MCNC: 8.9 MG/DL (ref 8.5–10.1)
CHLORIDE SERPL-SCNC: 106 MMOL/L (ref 100–111)
CO2 SERPL-SCNC: 32 MMOL/L (ref 21–32)
CREAT SERPL-MCNC: 0.86 MG/DL (ref 0.6–1.3)
DIFFERENTIAL METHOD BLD: ABNORMAL
EOSINOPHIL # BLD: 0.1 K/UL (ref 0–0.4)
EOSINOPHIL NFR BLD: 1 % (ref 0–5)
ERYTHROCYTE [DISTWIDTH] IN BLOOD BY AUTOMATED COUNT: 12.7 % (ref 11.6–14.5)
FERRITIN SERPL-MCNC: 44 NG/ML (ref 8–388)
GLOBULIN SER CALC-MCNC: 2.9 G/DL (ref 2–4)
GLUCOSE SERPL-MCNC: 59 MG/DL (ref 74–99)
HCT VFR BLD AUTO: 40.8 % (ref 35–45)
HGB BLD-MCNC: 13.3 G/DL (ref 12–16)
IRON SATN MFR SERPL: 35 % (ref 20–50)
IRON SERPL-MCNC: 112 UG/DL (ref 50–175)
LYMPHOCYTES # BLD: 1.7 K/UL (ref 0.9–3.6)
LYMPHOCYTES NFR BLD: 36 % (ref 21–52)
MCH RBC QN AUTO: 29.9 PG (ref 24–34)
MCHC RBC AUTO-ENTMCNC: 32.6 G/DL (ref 31–37)
MCV RBC AUTO: 91.7 FL (ref 74–97)
MONOCYTES # BLD: 0.4 K/UL (ref 0.05–1.2)
MONOCYTES NFR BLD: 8 % (ref 3–10)
NEUTS SEG # BLD: 2.6 K/UL (ref 1.8–8)
NEUTS SEG NFR BLD: 55 % (ref 40–73)
PLATELET # BLD AUTO: 181 K/UL (ref 135–420)
PMV BLD AUTO: 12.2 FL (ref 9.2–11.8)
POTASSIUM SERPL-SCNC: 4.1 MMOL/L (ref 3.5–5.5)
PROT SERPL-MCNC: 7 G/DL (ref 6.4–8.2)
RBC # BLD AUTO: 4.45 M/UL (ref 4.2–5.3)
SODIUM SERPL-SCNC: 142 MMOL/L (ref 136–145)
TIBC SERPL-MCNC: 323 UG/DL (ref 250–450)
WBC # BLD AUTO: 4.8 K/UL (ref 4.6–13.2)

## 2020-12-17 PROCEDURE — 85025 COMPLETE CBC W/AUTO DIFF WBC: CPT

## 2020-12-17 PROCEDURE — 90686 IIV4 VACC NO PRSV 0.5 ML IM: CPT | Performed by: FAMILY MEDICINE

## 2020-12-17 PROCEDURE — 80053 COMPREHEN METABOLIC PANEL: CPT

## 2020-12-17 PROCEDURE — G0008 ADMIN INFLUENZA VIRUS VAC: HCPCS | Performed by: FAMILY MEDICINE

## 2020-12-17 PROCEDURE — 83540 ASSAY OF IRON: CPT

## 2020-12-17 PROCEDURE — 82728 ASSAY OF FERRITIN: CPT

## 2020-12-17 PROCEDURE — 82306 VITAMIN D 25 HYDROXY: CPT

## 2020-12-19 NOTE — PROGRESS NOTES
All reassuring, Polly Sidhu. I do not see any current treatment for your osteoporosis. Correcting the vit d deficiency was necessary to help prevent further bone loss, but should not be falsely reassuring as sufficient to mitigate your risk of fracture. I'm prepared to discuss risks and benefits of treatment options at your discretion.  FERN

## 2020-12-19 NOTE — PROGRESS NOTES
Diagnoses and all orders for this visit:    1. Vitamin D deficiency  -     METABOLIC PANEL, COMPREHENSIVE; Future  -     VITAMIN D, 25 HYDROXY; Future    2. Age-related osteoporosis without current pathological fracture  -     METABOLIC PANEL, COMPREHENSIVE; Future  -     VITAMIN D, 25 HYDROXY; Future    3. History of iron deficiency  -     CBC WITH AUTOMATED DIFF; Future  -     FERRITIN; Future    4. History of GI bleed    5. Medicare annual wellness visit, subsequent    6. Advanced directives, counseling/discussion  -     ADVANCE CARE PLANNING FIRST 30 MINS    7. Encounter for screening mammogram for malignant neoplasm of breast  -     ERICKA MAMMO BI SCREENING INCL CAD; Future      Vit d def: Well controlled, continue present management  Iron deficiency resolved. No evidence continued GI bleed.    Invited to discuss osteoporosis management options  otherwise follow up 1 year for AWV, vit d def/osteoporosis follow up, labs prior    aHl Owens MD

## 2021-01-29 ENCOUNTER — TELEPHONE (OUTPATIENT)
Dept: FAMILY MEDICINE CLINIC | Age: 66
End: 2021-01-29

## 2021-01-29 NOTE — TELEPHONE ENCOUNTER
Pt has agreed to be seen at the [facility]. appt was scheduled for Monday 2/1 at 130. Pt was given address/phone number. Per dr camarillo pt can also take OTC Aleve, Motrin, or Tylenol. Pt agreed with plan. Pt expressed clear understanding and had no further questions.

## 2021-01-29 NOTE — TELEPHONE ENCOUNTER
Pt would like to know if she can take an anti-inflammatory for her sore throat. She has an appt with [facility] tomorrow for eval/testing.

## 2021-01-29 NOTE — TELEPHONE ENCOUNTER
Yes. I'm concerned about what [facility] will be doing. She needs to be seen. Likely tested for strep, flu and COVID, examined for completely different potential causes - and then treated. Is [facility] doing all that??? She needs more than lab collection. Hence my recommendation for [facility] appt.  FERN

## 2021-01-29 NOTE — TELEPHONE ENCOUNTER
Spoke with patient. She states she woke up with throat pain on the left side. She said now the left side of her throat is 'extremely' painful. Denies any symptoms ie fever, chills, body ache, HA, diarrhea, N/V. She said she was COVID tested about a week ago and was negative. I told her that I would send a msg to Dr. Diana Rosales. She may want to repeat COVID testing. I told pt that I would call her back once Dr Diana Rosales responds. Pt expressed clear understanding and had no further questions.

## 2021-01-29 NOTE — TELEPHONE ENCOUNTER
Patient called requesting a call back from [facility] in ref to her having a sore throat. Patient can be reached at [phone].

## 2021-02-01 ENCOUNTER — HOSPITAL ENCOUNTER (OUTPATIENT)
Dept: LAB | Age: 66
Discharge: HOME | End: 2021-02-01
Payer: MEDICARE

## 2021-02-01 ENCOUNTER — OFFICE VISIT (OUTPATIENT)
Dept: FAMILY MEDICINE CLINIC | Age: 66
End: 2021-02-01
Payer: MEDICARE

## 2021-02-01 VITALS
HEART RATE: 66 BPM | WEIGHT: 125 LBS | HEIGHT: 68 IN | BODY MASS INDEX: 18.94 KG/M2 | OXYGEN SATURATION: 95 % | SYSTOLIC BLOOD PRESSURE: 123 MMHG | DIASTOLIC BLOOD PRESSURE: 79 MMHG | RESPIRATION RATE: 16 BRPM | TEMPERATURE: 98.2 F

## 2021-02-01 DIAGNOSIS — B34.9 HEADACHE DUE TO VIRAL INFECTION: ICD-10-CM

## 2021-02-01 DIAGNOSIS — J02.9 SORE THROAT: ICD-10-CM

## 2021-02-01 DIAGNOSIS — J02.9 SORE THROAT: Primary | ICD-10-CM

## 2021-02-01 DIAGNOSIS — J02.0 STREP PHARYNGITIS: ICD-10-CM

## 2021-02-01 DIAGNOSIS — R51.9 HEADACHE DUE TO VIRAL INFECTION: ICD-10-CM

## 2021-02-01 LAB
FLUAV+FLUBV AG NOSE QL IA.RAPID: NEGATIVE
FLUAV+FLUBV AG NOSE QL IA.RAPID: NEGATIVE
S PYO AG THROAT QL: POSITIVE
VALID INTERNAL CONTROL?: YES
VALID INTERNAL CONTROL?: YES

## 2021-02-01 PROCEDURE — 87804 INFLUENZA ASSAY W/OPTIC: CPT | Performed by: NURSE PRACTITIONER

## 2021-02-01 PROCEDURE — G8420 CALC BMI NORM PARAMETERS: HCPCS | Performed by: NURSE PRACTITIONER

## 2021-02-01 PROCEDURE — 1101F PT FALLS ASSESS-DOCD LE1/YR: CPT | Performed by: NURSE PRACTITIONER

## 2021-02-01 PROCEDURE — G8427 DOCREV CUR MEDS BY ELIG CLIN: HCPCS | Performed by: NURSE PRACTITIONER

## 2021-02-01 PROCEDURE — 87880 STREP A ASSAY W/OPTIC: CPT | Performed by: NURSE PRACTITIONER

## 2021-02-01 PROCEDURE — G8432 DEP SCR NOT DOC, RNG: HCPCS | Performed by: NURSE PRACTITIONER

## 2021-02-01 PROCEDURE — G9899 SCRN MAM PERF RSLTS DOC: HCPCS | Performed by: NURSE PRACTITIONER

## 2021-02-01 PROCEDURE — G8536 NO DOC ELDER MAL SCRN: HCPCS | Performed by: NURSE PRACTITIONER

## 2021-02-01 PROCEDURE — U0003 INFECTIOUS AGENT DETECTION BY NUCLEIC ACID (DNA OR RNA); SEVERE ACUTE RESPIRATORY SYNDROME CORONAVIRUS 2 (SARS-COV-2) (CORONAVIRUS DISEASE [COVID-19]), AMPLIFIED PROBE TECHNIQUE, MAKING USE OF HIGH THROUGHPUT TECHNOLOGIES AS DESCRIBED BY CMS-2020-01-R: HCPCS

## 2021-02-01 PROCEDURE — 3017F COLORECTAL CA SCREEN DOC REV: CPT | Performed by: NURSE PRACTITIONER

## 2021-02-01 PROCEDURE — 99213 OFFICE O/P EST LOW 20 MIN: CPT | Performed by: NURSE PRACTITIONER

## 2021-02-01 PROCEDURE — 1090F PRES/ABSN URINE INCON ASSESS: CPT | Performed by: NURSE PRACTITIONER

## 2021-02-01 PROCEDURE — 36415 COLL VENOUS BLD VENIPUNCTURE: CPT

## 2021-02-01 RX ORDER — PENICILLIN V POTASSIUM 500 MG/1
500 TABLET, FILM COATED ORAL 2 TIMES DAILY
Qty: 20 TAB | Refills: 0 | Status: SHIPPED | OUTPATIENT
Start: 2021-02-01 | End: 2021-02-11

## 2021-02-01 NOTE — PROGRESS NOTES
Nathan Harrington presents today for   Chief Complaint   Patient presents with    Sore Throat     5 days       Is someone accompanying this pt? No      Is the patient using any DME equipment during OV? no    Travel and Exposure Screening was performed during check in or rooming process yes      Depression Screening:  3 most recent PHQ Screens 12/8/2020   Little interest or pleasure in doing things Not at all   Feeling down, depressed, irritable, or hopeless Not at all   Total Score PHQ 2 0   Trouble falling or staying asleep, or sleeping too much Not at all   Feeling tired or having little energy Not at all   Poor appetite, weight loss, or overeating Not at all   Feeling bad about yourself - or that you are a failure or have let yourself or your family down Not at all   Trouble concentrating on things such as school, work, reading, or watching TV Not at all   Moving or speaking so slowly that other people could have noticed; or the opposite being so fidgety that others notice Not at all   Thoughts of being better off dead, or hurting yourself in some way Not at all   PHQ 9 Score 0   How difficult have these problems made it for you to do your work, take care of your home and get along with others -       Fall Risk  Fall Risk Assessment, last 12 mths 12/8/2020   Able to walk? Yes   Fall in past 12 months? No       This Visit Test  Results for orders placed or performed during the hospital encounter of 12/17/20   CBC WITH AUTOMATED DIFF   Result Value Ref Range    WBC 4.8 4.6 - 13.2 K/uL    RBC 4.45 4.20 - 5.30 M/uL    HGB 13.3 12.0 - 16.0 g/dL    HCT 40.8 35.0 - 45.0 %    MCV 91.7 74.0 - 97.0 FL    MCH 29.9 24.0 - 34.0 PG    MCHC 32.6 31.0 - 37.0 g/dL    RDW 12.7 11.6 - 14.5 %    PLATELET 205 327 - 429 K/uL    MPV 12.2 (H) 9.2 - 11.8 FL    NEUTROPHILS 55 40 - 73 %    LYMPHOCYTES 36 21 - 52 %    MONOCYTES 8 3 - 10 %    EOSINOPHILS 1 0 - 5 %    BASOPHILS 0 0 - 2 %    ABS. NEUTROPHILS 2.6 1.8 - 8.0 K/UL    ABS. LYMPHOCYTES 1.7 0.9 - 3.6 K/UL    ABS. MONOCYTES 0.4 0.05 - 1.2 K/UL    ABS. EOSINOPHILS 0.1 0.0 - 0.4 K/UL    ABS. BASOPHILS 0.0 0.0 - 0.1 K/UL    DF AUTOMATED     FERRITIN   Result Value Ref Range    Ferritin 44 8 - 430 NG/ML   METABOLIC PANEL, COMPREHENSIVE   Result Value Ref Range    Sodium 142 136 - 145 mmol/L    Potassium 4.1 3.5 - 5.5 mmol/L    Chloride 106 100 - 111 mmol/L    CO2 32 21 - 32 mmol/L    Anion gap 4 3.0 - 18 mmol/L    Glucose 59 (L) 74 - 99 mg/dL    BUN 15 7.0 - 18 MG/DL    Creatinine 0.86 0.6 - 1.3 MG/DL    BUN/Creatinine ratio 17 12 - 20      GFR est AA >60 >60 ml/min/1.73m2    GFR est non-AA >60 >60 ml/min/1.73m2    Calcium 8.9 8.5 - 10.1 MG/DL    Bilirubin, total 0.6 0.2 - 1.0 MG/DL    ALT (SGPT) 26 13 - 56 U/L    AST (SGOT) 18 10 - 38 U/L    Alk.  phosphatase 55 45 - 117 U/L    Protein, total 7.0 6.4 - 8.2 g/dL    Albumin 4.1 3.4 - 5.0 g/dL    Globulin 2.9 2.0 - 4.0 g/dL    A-G Ratio 1.4 0.8 - 1.7     VITAMIN D, 25 HYDROXY   Result Value Ref Range    Vitamin D 25-Hydroxy 41.2 30 - 100 ng/mL   IRON PROFILE   Result Value Ref Range    Iron 112 50 - 175 ug/dL    TIBC 323 250 - 450 ug/dL    Iron % saturation 35 20 - 50 %

## 2021-02-01 NOTE — PROGRESS NOTES
SUBJECTIVE:  Chief Complaint   Patient presents with    Sore Throat     5 days     Patient denies recent travel. Pt exposed to sick contacts under investigations for possible Covid NO. Pt is not a current smoker. OBJECTIVE    Visit Vitals  /79 (BP 1 Location: Left upper arm, BP Patient Position: Sitting)   Pulse 66   Temp 98.2 °F (36.8 °C) (Oral)   Resp 16   Ht 5' 8\" (1.727 m)   Wt 125 lb (56.7 kg)   SpO2 95%   BMI 19.01 kg/m²      General:  healthy, alert, well developed, well nourished, cooperative, in no apparent distress and in mild distress. Sick but not toxic appearing. Eyes:   The lids are without swelling, lesions, or drainage. The conjunctiva is clear and noninjected. ENT:  ENT exam normal, no neck nodes or sinus tenderness and bilateral TM normal without fluid or infection. Neck: normal, supple and no adenopathy. Lungs/CV: clear to auscultation, no wheezes or rales and unlabored breathing. Heart: regular rhythm normal rate. Skin:  No rashes, no jaundice. ASSESSMENT / PLAN     ICD-10-CM ICD-9-CM    1. Sore throat  J02.9 462 AMB POC RAPID STREP A      AMB POC RAPID INFLUENZA TEST      NOVEL CORONAVIRUS (COVID-19)      penicillin v potassium (VEETID) 500 mg tablet   2. Headache due to viral infection  B34.9 079.99 AMB POC RAPID STREP A    R51.9 784.0 AMB POC RAPID INFLUENZA TEST      NOVEL CORONAVIRUS (COVID-19)      penicillin v potassium (VEETID) 500 mg tablet   3.  Strep pharyngitis  J02.0 034.0 penicillin v potassium (VEETID) 500 mg tablet     Results for orders placed or performed in visit on 02/01/21   AMB POC RAPID STREP A   Result Value Ref Range    VALID INTERNAL CONTROL POC Yes     Group A Strep Ag Positive Negative   AMB POC RAPID INFLUENZA TEST   Result Value Ref Range    VALID INTERNAL CONTROL POC Yes     Influenza A Ag POC Negative Negative    Influenza B Ag POC Negative Negative         Based on CDC recommendations and limited testing supplies, only those patients who meet criteria will be tested for Covid. High priority groups for testing   Symptomatic and/or Exposure /Test for Covid  Immunocompromised host (on prednisone, biological therapy, blood cancer, metastatic cancer or active chemotherapy)   [facility] worker in the home    Other high-risk group: o age >47   o Uncontrolled DM   o Uncontrolled HTN   o BMI >40, CKD/ESRD    Dialysis patients (patients going to HD units, not asymptomatic home HD/PD)    Anyone living in a congregate setting     Non High-risk patient category           Test for COVID-19   Asymptomatic, no known exposure  No    Asymptomatic, possible exposure  No    Asymptomatic, definite exposure  Provider discretion    Symptomatic, no known exposure  Yes    Symptomatic, + exposure  Yes      Patient does  meet criteria for Covid testing. COVID-19 testing was completed. Work note was given until [employer] are available. If Covid testing was completed and is negative, patient may return back to work despite quarantine originally set in place if applicable. Instructed pt on the importance of rest, fluid intake (with avoidance of red fluids), zinc and vit C supplements. Instructed pt to check temp if possible and to take acetaminophen or NSAIDs if fevers are noted. Instructed patient to remember to wash hands, disinfect surroundings, and avoid touching face. Instructed pt to remain home and and self quarantine until Covid results are negative and all symptoms have improved or subsided. If they must leave home, wear a mask. Patient verbalized understanding. We have provided the patient with a detailed after visit summary which was reviewed, and red flag symptoms that would warrant an ER visit were emphasized. CC'd chart to PCP: Yes: Comment: MD Anya Christie Has, NP-C    This visit was provided as a focused evaluation during the COVID -19 pandemic/national emergency.   A comprehensive review of all previous patient history and testing was not conducted. Pertinent findings were elicited during the visit.

## 2021-02-03 LAB — SARS-COV-2, COV2NT: NOT DETECTED

## 2021-02-04 NOTE — PROGRESS NOTES
[facility] clinic nurses: Please notify pt their Covid test was Negative. If patients symptoms have not improved, they need to follow-up with their PCP. Remind pt to stay home but if they must leave home, take all precautions: wear a mask, continue social distancing, disinfect home/work areas, avoid touching the face, and sanitize hands frequently. If they need a return to work note, please provide. Thank you.

## 2021-05-08 DIAGNOSIS — Z12.31 ENCOUNTER FOR SCREENING MAMMOGRAM FOR MALIGNANT NEOPLASM OF BREAST: ICD-10-CM

## 2021-12-01 ENCOUNTER — TELEPHONE (OUTPATIENT)
Dept: MAMMOGRAPHY | Age: 66
End: 2021-12-01

## 2021-12-01 NOTE — TELEPHONE ENCOUNTER
I have attempted to contact this patient via  phone with the following results: left message to return my call at [phone] . I was attempting to reach to assist her with scheduling a mammogram appointment .

## 2022-01-04 ENCOUNTER — VIRTUAL VISIT (OUTPATIENT)
Dept: FAMILY MEDICINE CLINIC | Age: 67
End: 2022-01-04
Payer: MEDICARE

## 2022-01-04 DIAGNOSIS — K64.9 HEMORRHOIDS, UNSPECIFIED HEMORRHOID TYPE: Primary | ICD-10-CM

## 2022-01-04 PROCEDURE — 99213 OFFICE O/P EST LOW 20 MIN: CPT | Performed by: FAMILY MEDICINE

## 2022-01-04 PROCEDURE — G8536 NO DOC ELDER MAL SCRN: HCPCS | Performed by: FAMILY MEDICINE

## 2022-01-04 PROCEDURE — 1101F PT FALLS ASSESS-DOCD LE1/YR: CPT | Performed by: FAMILY MEDICINE

## 2022-01-04 PROCEDURE — G8510 SCR DEP NEG, NO PLAN REQD: HCPCS | Performed by: FAMILY MEDICINE

## 2022-01-04 PROCEDURE — G8427 DOCREV CUR MEDS BY ELIG CLIN: HCPCS | Performed by: FAMILY MEDICINE

## 2022-01-04 PROCEDURE — G9899 SCRN MAM PERF RSLTS DOC: HCPCS | Performed by: FAMILY MEDICINE

## 2022-01-04 PROCEDURE — 3017F COLORECTAL CA SCREEN DOC REV: CPT | Performed by: FAMILY MEDICINE

## 2022-01-04 PROCEDURE — G8420 CALC BMI NORM PARAMETERS: HCPCS | Performed by: FAMILY MEDICINE

## 2022-01-04 PROCEDURE — 1090F PRES/ABSN URINE INCON ASSESS: CPT | Performed by: FAMILY MEDICINE

## 2022-01-04 RX ORDER — BISACODYL 5 MG
5 TABLET, DELAYED RELEASE (ENTERIC COATED) ORAL
Qty: 60 TABLET | Refills: 1 | Status: SHIPPED | OUTPATIENT
Start: 2022-01-04

## 2022-01-04 NOTE — PROGRESS NOTES
Tyrel Oliver is a 74 y.o. female, established patient, who was seen by synchronous (real-time) audio-video technology on 1/4/2022 for     Assessment & Plan:   1. Hemorrhoids, unspecified hemorrhoid type  -     bisacodyL (DULCOLAX) 5 mg EC tablet; Take 1 Tablet by mouth three (3) times daily as needed for Constipation. , Normal, Disp-60 Tablet, R-1     Sitz baths  Not describing constipation with easy to pass stools, however, offered:    1. Soak a few dry prunes and /or apricot or figs in a glass of water for 12-24 hours   2. Before sleeping each night drink the fluid and eat the dry soaked fruits   3. In the morning have a large glass of warm/lukewarm water with 1tsp of salt and a bit of lemon as soon as you wake up and then go about doing your shower, and breakfast   Most likely you would have a BM. It may take a few days for your bowels to become regular. Alternatively, Miralax (or generic) is an over the counter product that is very effective at controlling constipation when taken daily and is safe for chronic use. Stressed need for exam if lesion doesn't resolve over the course of the next few weeks       712  Subjective:     Non bleeding (presumptive) hemorrhoid x 3 days, inflamed  No prior hx    BMs q2-3 days easy to pass  Walks at least 1.5 miles daily  Plant based diet  Well hydrated    Requesting non pharm interventions as best possible. Applying aloe vera cream and steam    Prior to Admission medications    Medication Sig Start Date End Date Taking? Authorizing Provider   OTHER Indications: Homemade herbal tea  Patient not taking: No sig reported    Provider, Historical   BURDOCK ROOT PO Take  by mouth. Patient not taking: Reported on 1/4/2022    Provider, Historical   OTHER Indications: Yellow Dock  Patient not taking: No sig reported    Provider, Historical   elderberry fruit (ELDERBERRY PO) Take  by mouth.   Patient not taking: Reported on 1/4/2022    Provider, Historical   OTHER Indications: Isreal krishnan  Patient not taking: No sig reported    Provider, Historical   OTHER Indications: Dandelion root  Patient not taking: No sig reported    Provider, Historical   OTHER Indications: Licorice  Patient not taking: No sig reported    Provider, Historical         Objective:     BP Readings from Last 3 Encounters:   02/01/21 123/79   02/11/20 96/60   12/03/19 100/72       General: alert, cooperative, no distress   Mental  status: normal mood, behavior, speech, dress, motor activity, and thought processes, able to follow commands   HENT: NCAT   Neck: no visualized mass   Resp: no respiratory distress   Neuro: no gross deficits   Skin: no discoloration or lesions of concern on visible areas   Psychiatric: normal affect, consistent with stated mood, no evidence of hallucinations     Additional exam findings: We discussed the expected course, resolution and complications of the diagnosis(es) in detail. Medication risks, benefits, costs, interactions, and alternatives were discussed as indicated. I advised her to contact the office if her condition worsens, changes or fails to improve as anticipated. She expressed understanding with the diagnosis(es) and plan. Julia Kessler was evaluated through a patient-initiated, synchronous (real-time) audio-video encounter. The family is aware that it is a billable service. Verbal consent to proceed has been obtained within the past 12 months. It was conducted pursuant to the emergency declaration under the [address] [place] [address], [address] [place] [address] authority and the [employer] and [employer] General Act. Patient identification was verified, and a caregiver was present when appropriate. I was at home or in the office. The patient was at home.       Simon Cantu MD

## 2022-01-04 NOTE — PROGRESS NOTES
Patient being seen via VV today with c/o constipation that she has had for years. She says this has caused her to develop a hemorrhoid. She denies any bleeding, no itching but does have pain associated. 1. \"Have you been to the ER, urgent care clinic since your last visit? Hospitalized since your last visit? \" No    2. \"Have you seen or consulted any other health care providers outside of the [address] [place] [address] since your last visit? \" No     3. For patients aged 39-70: Has the patient had a colonoscopy / FIT/ Cologuard? Yes, HM satisfied with blue hyperlink     If the patient is female:    4. For patients aged 41-77: Has the patient had a mammogram within the past 2 years? No    5. For patients aged 21-65: Has the patient had a pap smear?  NA based on age or sex

## 2022-01-04 NOTE — PATIENT INSTRUCTIONS
1. Soak a few dry prunes and /or apricot or figs in a glass of water for 12-24 hours   2. Before sleeping each night drink the fluid and eat the dry soaked fruits   3. In the morning have a large glass of warm/lukewarm water with 1tsp of salt and a bit of lemon as soon as you wake up and then go about doing your shower, and breakfast   Most likely you would have a BM. It may take a few days for your bowels to become regular. Alternatively, Miralax (or generic) is an over the counter product that is very effective at controlling constipation when taken daily and is safe for chronic use. Hemorrhoids: Care Instructions  Overview     Hemorrhoids are swollen veins that develop in the anal canal. Bleeding during bowel movements, itching, and rectal pain are the most common symptoms. Hemorrhoids can be uncomfortable at times, but rarely are they a serious problem. Most of the time, you can treat them with simple changes to your diet and bowel habits. These changes include eating more fiber and not straining to pass stools. Most hemorrhoids don't need surgery or other treatment unless they are very large and painful or bleed a lot. Follow-up care is a key part of your treatment and safety. Be sure to make and go to all appointments, and call your doctor if you are having problems. It's also a good idea to know your test results and keep a list of the medicines you take. How can you care for yourself at home? · Sit in a few inches of warm water (sitz bath) 3 times a day and after bowel movements. The warm water helps with pain and itching. · Put ice on your anal area several times a day for 10 minutes at a time. Put a thin cloth between the ice and your skin. Follow this by placing a warm, wet towel on the area for another 10 to 20 minutes. · Take pain medicines exactly as directed. ? If the doctor gave you a prescription medicine for pain, take it as prescribed.   ? If you are not taking a prescription pain medicine, ask your doctor if you can take an over-the-counter medicine. · Keep the anal area clean, but be gentle. Use water and a fragrance-free soap, or use baby wipes or medicated pads such as Tucks. · Wear cotton underwear and loose clothing to decrease moisture in the anal area. · Eat more fiber. Include foods such as whole-grain breads and cereals, raw vegetables, raw and dried fruits, and beans. · Drink plenty of fluids. If you have kidney, heart, or liver disease and have to limit fluids, talk with your doctor before you increase the amount of fluids you drink. · Use a stool softener that contains bran or psyllium. You can save money by buying bran or psyllium (available in bulk at most health food stores) and sprinkling it on foods or stirring it into fruit juice. Or you can use a product such as Metamucil or Hydrocil. · Practice healthy bowel habits. ? Go to the bathroom as soon as you have the urge. ? Avoid straining to pass stools. Relax and give yourself time to let things happen naturally. ? Do not hold your breath while passing stools. ? Do not read while sitting on the toilet. Get off the toilet as soon as you have finished. · Take your medicines exactly as prescribed. Call your doctor if you think you are having a problem with your medicine. When should you call for help? Call 911 anytime you think you may need emergency care. For example, call if:    · You pass maroon or very bloody stools. Call your doctor now or seek immediate medical care if:    · You have increased pain.     · You have increased bleeding. Watch closely for changes in your health, and be sure to contact your doctor if:    · Your symptoms have not improved after 3 or 4 days. Where can you learn more? Go to http://www.[employer].com/  Enter F228 in the search box to learn more about \"Hemorrhoids: Care Instructions. \"  Current as of: February 10, 2021               Content Version: 13.0  © 7650-4873 Healthwise, Incorporated. Care instructions adapted under license by [employer] (which disclaims liability or warranty for this information). If you have questions about a medical condition or this instruction, always ask your healthcare professional. [address] any warranty or liability for your use of this information.

## 2022-01-07 ENCOUNTER — TELEPHONE (OUTPATIENT)
Dept: FAMILY MEDICINE CLINIC | Age: 67
End: 2022-01-07

## 2022-01-07 NOTE — TELEPHONE ENCOUNTER
Called patient  verified she was made aware Dr. Roe Valencia would like her to come in on  for an appt. Patient says she was seen by a proctologists this morning in [place] and was told this is a thrombosed  hemorrhoid and will heal on it's own. She says she just wanted a second opinion she also asked to let Dr. Roe Valencia know the warm baths were increasing swelling and she has switched to cool baths and cold compresses and this has helped her pain.

## 2022-01-07 NOTE — TELEPHONE ENCOUNTER
----- Message from Turner Bell sent at 1/6/2022 12:11 PM EST -----  Subject: Message to Provider    QUESTIONS  Information for Provider? Pt left a message yesterday on the portal and   she would like Dr. Kelley Deshpande or Rj Turner to call her back. Pt would like to   inform that she has a made an appointment with Proctologist in [facility]. ---------------------------------------------------------------------------  --------------  Susu Fine INFO  What is the best way for the office to contact you? OK to leave message on   voicemail  Preferred Call Back Phone Number? [phone]  ---------------------------------------------------------------------------  --------------  SCRIPT ANSWERS  Relationship to Patient?  Self

## 2022-03-19 PROBLEM — E55.9 VITAMIN D DEFICIENCY: Status: ACTIVE | Noted: 2020-12-08

## 2022-03-19 PROBLEM — M81.0 AGE-RELATED OSTEOPOROSIS WITHOUT CURRENT PATHOLOGICAL FRACTURE: Status: ACTIVE | Noted: 2020-01-24

## 2022-03-20 PROBLEM — Z87.19 HISTORY OF GI BLEED: Status: ACTIVE | Noted: 2020-12-08

## 2023-01-18 ENCOUNTER — E-VISIT (OUTPATIENT)
Dept: FAMILY MEDICINE CLINIC | Age: 68
End: 2023-01-18
Payer: MEDICARE

## 2023-01-18 DIAGNOSIS — J22 ACUTE RESPIRATORY INFECTION: Primary | ICD-10-CM

## 2023-01-18 PROCEDURE — 1101F PT FALLS ASSESS-DOCD LE1/YR: CPT | Performed by: FAMILY MEDICINE

## 2023-01-18 PROCEDURE — 99421 OL DIG E/M SVC 5-10 MIN: CPT | Performed by: FAMILY MEDICINE

## 2023-01-19 RX ORDER — AMOXICILLIN 500 MG/1
500 CAPSULE ORAL 3 TIMES DAILY
Qty: 21 CAPSULE | Refills: 0 | Status: SHIPPED | OUTPATIENT
Start: 2023-01-19 | End: 2023-01-26

## 2023-01-19 NOTE — PROGRESS NOTES
Rosaline Molina (1955) initiated an asynchronous digital communication through [address] [place] [address]. HPI: per patient questionnaire     Exam: not applicable    Diagnoses and all orders for this visit:  Diagnoses and all orders for this visit:    1. Acute respiratory infection  -     amoxicillin (AMOXIL) 500 mg capsule; Take 1 Capsule by mouth three (3) times daily for 7 days. Time: EV1 - 5-10 minutes were spent on the digital evaluation and management of this patient.       Anatoliy Lieberman MD

## 2023-04-21 ENCOUNTER — OFFICE VISIT (OUTPATIENT)
Facility: CLINIC | Age: 68
End: 2023-04-21
Payer: MEDICARE

## 2023-04-21 VITALS — HEIGHT: 68 IN | WEIGHT: 116 LBS | BODY MASS INDEX: 17.58 KG/M2

## 2023-04-21 DIAGNOSIS — R05.8 PRODUCTIVE COUGH: Primary | ICD-10-CM

## 2023-04-21 PROCEDURE — 99213 OFFICE O/P EST LOW 20 MIN: CPT | Performed by: STUDENT IN AN ORGANIZED HEALTH CARE EDUCATION/TRAINING PROGRAM

## 2023-04-21 PROCEDURE — 3017F COLORECTAL CA SCREEN DOC REV: CPT | Performed by: STUDENT IN AN ORGANIZED HEALTH CARE EDUCATION/TRAINING PROGRAM

## 2023-04-21 PROCEDURE — G8399 PT W/DXA RESULTS DOCUMENT: HCPCS | Performed by: STUDENT IN AN ORGANIZED HEALTH CARE EDUCATION/TRAINING PROGRAM

## 2023-04-21 PROCEDURE — G8427 DOCREV CUR MEDS BY ELIG CLIN: HCPCS | Performed by: STUDENT IN AN ORGANIZED HEALTH CARE EDUCATION/TRAINING PROGRAM

## 2023-04-21 PROCEDURE — G8419 CALC BMI OUT NRM PARAM NOF/U: HCPCS | Performed by: STUDENT IN AN ORGANIZED HEALTH CARE EDUCATION/TRAINING PROGRAM

## 2023-04-21 PROCEDURE — 1036F TOBACCO NON-USER: CPT | Performed by: STUDENT IN AN ORGANIZED HEALTH CARE EDUCATION/TRAINING PROGRAM

## 2023-04-21 PROCEDURE — 1090F PRES/ABSN URINE INCON ASSESS: CPT | Performed by: STUDENT IN AN ORGANIZED HEALTH CARE EDUCATION/TRAINING PROGRAM

## 2023-04-21 PROCEDURE — 1123F ACP DISCUSS/DSCN MKR DOCD: CPT | Performed by: STUDENT IN AN ORGANIZED HEALTH CARE EDUCATION/TRAINING PROGRAM

## 2023-04-21 RX ORDER — GUAIFENESIN AND DEXTROMETHORPHAN HYDROBROMIDE 600; 30 MG/1; MG/1
1 TABLET, EXTENDED RELEASE ORAL DAILY
COMMUNITY

## 2023-04-21 SDOH — ECONOMIC STABILITY: FOOD INSECURITY: WITHIN THE PAST 12 MONTHS, THE FOOD YOU BOUGHT JUST DIDN'T LAST AND YOU DIDN'T HAVE MONEY TO GET MORE.: NEVER TRUE

## 2023-04-21 SDOH — ECONOMIC STABILITY: HOUSING INSECURITY
IN THE LAST 12 MONTHS, WAS THERE A TIME WHEN YOU DID NOT HAVE A STEADY PLACE TO SLEEP OR SLEPT IN A SHELTER (INCLUDING NOW)?: NO

## 2023-04-21 SDOH — ECONOMIC STABILITY: FOOD INSECURITY: WITHIN THE PAST 12 MONTHS, YOU WORRIED THAT YOUR FOOD WOULD RUN OUT BEFORE YOU GOT MONEY TO BUY MORE.: NEVER TRUE

## 2023-04-21 SDOH — ECONOMIC STABILITY: INCOME INSECURITY: HOW HARD IS IT FOR YOU TO PAY FOR THE VERY BASICS LIKE FOOD, HOUSING, MEDICAL CARE, AND HEATING?: NOT HARD AT ALL

## 2023-04-21 ASSESSMENT — PATIENT HEALTH QUESTIONNAIRE - PHQ9
SUM OF ALL RESPONSES TO PHQ QUESTIONS 1-9: 0
SUM OF ALL RESPONSES TO PHQ9 QUESTIONS 1 & 2: 0
SUM OF ALL RESPONSES TO PHQ QUESTIONS 1-9: 0
SUM OF ALL RESPONSES TO PHQ QUESTIONS 1-9: 0
2. FEELING DOWN, DEPRESSED OR HOPELESS: 0
SUM OF ALL RESPONSES TO PHQ QUESTIONS 1-9: 0
1. LITTLE INTEREST OR PLEASURE IN DOING THINGS: 0

## 2023-04-21 NOTE — PROGRESS NOTES
Chief Complaint   Patient presents with    Cough    Chest Congestion     Pt states she had a bacterial lung infection at the beginning of the year. Treated w/abx and felt better. On April 14th she began with similar sxs again and wanted to have the dr check her lungs. She denies any fever or body aches. Has coughed up 'some mucus', but denies any blood tinge. Has been taking OTC mucinex 600 mg once a day prn.

## 2023-04-21 NOTE — PROGRESS NOTES
Eva Fontaine (: 1955) is a 69 y.o. female, established patient, here for evaluation of the following chief complaint(s):  Cough and Chest Congestion       Assessment/Plan:  1. Productive cough- Likely multifactorial. Low level viral URI, potentially allergies in the setting of gabriella work. Recommend full N95, Mucinex BID, increased water intake, deep breathing techniques, rest. Expect improvement in the next 1-2 weeks. If acutely worsening, reach back out to clinic. Return if symptoms worsen or fail to improve. Subjective/Objective:  HPI    Had URI at the beginning of the year. Did abx for a week and improved. Still felt like it took 2-3 more weeks to get lungs back to normal.  - Now in the last 10 days has had exposures. 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 - Symptoms include productive cough (green/white), lethargy. Denies fevers, normal home pulse ox, nausea, SoB, [place]. Meds attempted: Mucinex    Physical Exam  Height 5' 8\" (1.727 m), weight 116 lb (52.6 kg). Body mass index is 17.64 kg/m². General appearance - Alert, NAD, normal appearance  Head - Atraumatic. Normocephalic. No lymphadenopathy  Eyes - EOMI. Sclera white. Ears - Hearing grossly normal. TMs without erythema or bulge bilaterally. No cerumen impaction. Nose - Nares patent, no polyps  Throat - moist, pharynx without erythema or exudates. Respiratory - LCTAB. Effort normal, without respiratory distress. No wheeze/rale/rhonchi  Heart - Normal rate, regular rhythm. No m/r/r  Neurological - Grossly focal deficits. Speech normal. Alert and oriented. Mental status is at baseline. Musculoskeletal - Grossly normal ROM, Gait normal.    Extremities - No LE edema. Skin - normal coloration and normal turgor. No cyanosis, no rash. Medical History- Reviewed Social History- Reviewed Surgical History- Reviewed   Aleshia Bautista has no past medical history on file. Aleshia Bautista reports that she has never smoked.  She has never used smokeless

## 2023-05-11 ENCOUNTER — TELEPHONE (OUTPATIENT)
Facility: CLINIC | Age: 68
End: 2023-05-11

## 2023-05-30 ENCOUNTER — OFFICE VISIT (OUTPATIENT)
Facility: CLINIC | Age: 68
End: 2023-05-30
Payer: MEDICARE

## 2023-05-30 VITALS
HEIGHT: 68 IN | BODY MASS INDEX: 17.73 KG/M2 | RESPIRATION RATE: 12 BRPM | TEMPERATURE: 98.4 F | DIASTOLIC BLOOD PRESSURE: 68 MMHG | SYSTOLIC BLOOD PRESSURE: 102 MMHG | HEART RATE: 78 BPM | OXYGEN SATURATION: 94 % | WEIGHT: 117 LBS

## 2023-05-30 DIAGNOSIS — S00.86XA TICK BITE OF OTHER PART OF HEAD, INITIAL ENCOUNTER: Primary | ICD-10-CM
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
## 2023-05-30 PROCEDURE — 3017F COLORECTAL CA SCREEN DOC REV: CPT | Performed by: STUDENT IN AN ORGANIZED HEALTH CARE EDUCATION/TRAINING PROGRAM

## 2023-05-30 PROCEDURE — 1036F TOBACCO NON-USER: CPT | Performed by: STUDENT IN AN ORGANIZED HEALTH CARE EDUCATION/TRAINING PROGRAM

## 2023-05-30 PROCEDURE — 99213 OFFICE O/P EST LOW 20 MIN: CPT | Performed by: STUDENT IN AN ORGANIZED HEALTH CARE EDUCATION/TRAINING PROGRAM

## 2023-05-30 PROCEDURE — G8419 CALC BMI OUT NRM PARAM NOF/U: HCPCS | Performed by: STUDENT IN AN ORGANIZED HEALTH CARE EDUCATION/TRAINING PROGRAM

## 2023-05-30 PROCEDURE — G8428 CUR MEDS NOT DOCUMENT: HCPCS | Performed by: STUDENT IN AN ORGANIZED HEALTH CARE EDUCATION/TRAINING PROGRAM

## 2023-05-30 PROCEDURE — 1123F ACP DISCUSS/DSCN MKR DOCD: CPT | Performed by: STUDENT IN AN ORGANIZED HEALTH CARE EDUCATION/TRAINING PROGRAM

## 2023-05-30 PROCEDURE — G8399 PT W/DXA RESULTS DOCUMENT: HCPCS | Performed by: STUDENT IN AN ORGANIZED HEALTH CARE EDUCATION/TRAINING PROGRAM

## 2023-05-30 PROCEDURE — 1090F PRES/ABSN URINE INCON ASSESS: CPT | Performed by: STUDENT IN AN ORGANIZED HEALTH CARE EDUCATION/TRAINING PROGRAM

## 2023-05-30 ASSESSMENT — PATIENT HEALTH QUESTIONNAIRE - PHQ9
SUM OF ALL RESPONSES TO PHQ9 QUESTIONS 1 & 2: 0
SUM OF ALL RESPONSES TO PHQ QUESTIONS 1-9: 0
2. FEELING DOWN, DEPRESSED OR HOPELESS: 0
SUM OF ALL RESPONSES TO PHQ QUESTIONS 1-9: 0
1. LITTLE INTEREST OR PLEASURE IN DOING THINGS: 0
SUM OF ALL RESPONSES TO PHQ QUESTIONS 1-9: 0
SUM OF ALL RESPONSES TO PHQ QUESTIONS 1-9: 0

## 2023-05-30 ASSESSMENT — ANXIETY QUESTIONNAIRES
4. TROUBLE RELAXING: 0
2. NOT BEING ABLE TO STOP OR CONTROL WORRYING: 0
GAD7 TOTAL SCORE: 0
3. WORRYING TOO MUCH ABOUT DIFFERENT THINGS: 0
1. FEELING NERVOUS, ANXIOUS, OR ON EDGE: 0
6. BECOMING EASILY ANNOYED OR IRRITABLE: 0
7. FEELING AFRAID AS IF SOMETHING AWFUL MIGHT HAPPEN: 0
5. BEING SO RESTLESS THAT IT IS HARD TO SIT STILL: 0

## 2023-05-30 NOTE — PROGRESS NOTES
Kely Mar presents today for   Chief Complaint   Patient presents with    Other     Tic bite swelling on jaw        Vitals:    05/30/23 1141   BP: 102/68   Site: Left Upper Arm   Position: Sitting   Pulse: 78   Resp: 12   Temp: 98.4 °F (36.9 °C)   TempSrc: Axillary   SpO2: 94%   Weight: 117 lb (53.1 kg)   Height: 5' 8\" (1.727 m)        Is someone accompanying this pt? no    Is the patient using any DME equipment during OV? no    Depression Screening:  PHQ-9 Questionaire 5/30/2023   Little interest or pleasure in doing things 0   Feeling down, depressed, or hopeless 0   PHQ-9 Total Score 0       Abuse Screening: AMB Abuse Screening 5/30/2023   Do you ever feel afraid of your partner? N   Are you in a relationship with someone who physically or mentally threatens you? N   Is it safe for you to go home? Y       Fall Screening  Fall Risk 5/30/2023   2 or more falls in past year? no   Fall with injury in past year? no       Generalized Anxiety  LEONCIO-7 SCREENING 5/30/2023   Feeling nervous, anxious, or on edge Not at all   Not being able to stop or control worrying Not at all   Worrying too much about different things Not at all   Trouble relaxing Not at all   Being so restless that it is hard to sit still Not at all   Becoming easily annoyed or irritable Not at all   Feeling afraid as if something awful might happen Not at all   LEONCIO-7 Total Score 0        Health Maintenance Due   Topic Date Due    Hepatitis C screen  Never done    DTaP/Tdap/Td vaccine (1 - Tdap) Never done    Breast cancer screen  Never done    Shingles vaccine (1 of 2) Never done    Annual Wellness Visit (AWV)  05/23/2022   . Health Maintenance reviewed and discussed and ordered per Provider. Vaccines Due   Screenings Due       eKly Mar is updated on all HM    1. \"Have you been to the ER, urgent care clinic since your last visit? Hospitalized since your last visit? \" No    2.  \"Have you seen or consulted any other health care providers

## 2023-05-30 NOTE — PROGRESS NOTES
Deborah Gandhi (: 1955) is a 77 y.o. female, established patient, here for evaluation of the following chief complaint(s): Other (Tic bite swelling on jaw )       Assessment/Plan:  1. Tick bite of other part of head, initial encounter-low risk of transmission as tick present for around 12 hours and not engorged. Outside of prophylactic window. Discussed early localized Lyme symptoms for which she should return to obtain doxycycline therapy. Discussed preventative measures for the future. Return if symptoms worsen or fail to improve. Subjective/Objective:  HPI    Tick bite- Present on face for ~12hours, found 4 days ago. Small dark brown, flat. Head intact. Not engorged. Was pruritic at first. Started oozing discharge 72-24hrs ago. Never had erythema migrans. Some swelling of local L's but these have resolved. Physical Exam  Blood pressure 102/68, pulse 78, temperature 98.4 °F (36.9 °C), temperature source Axillary, resp. rate 12, height 5' 8\" (1.727 m), weight 117 lb (53.1 kg), SpO2 94 %. Body mass index is 17.79 kg/m². General appearance - Alert, NAD, normal appearance. Head - Atraumatic. Normocephalic. Eyes - EOMI. Sclera white. Respiratory - Pulmonary effort is normal. No respiratory distress. Neurological - No gross focal deficits. Speech normal.   Psychiatric - Mood normal. Behavior normal.  Skin-small crusted punctate lesion to right cheek. No surrounding erythema. Negligible swelling locally. Medical History- Reviewed Social History- Reviewed Surgical History- Reviewed   Nadeem Sam has no past medical history on file. Nadeem Sam reports that she has never smoked. She has never used smokeless tobacco. She reports that she does not currently use alcohol. She reports that she does not use drugs. Nadeem Sam has a past surgical history that includes gyn and Hysterectomy ().          Problem List- Reviewed   Nadeem Sam has Age-related osteoporosis without current pathological fracture;

## 2023-06-20 ENCOUNTER — OFFICE VISIT (OUTPATIENT)
Facility: CLINIC | Age: 68
End: 2023-06-20
Payer: MEDICARE

## 2023-06-20 VITALS
BODY MASS INDEX: 17.88 KG/M2 | SYSTOLIC BLOOD PRESSURE: 104 MMHG | TEMPERATURE: 98.7 F | HEIGHT: 68 IN | HEART RATE: 59 BPM | DIASTOLIC BLOOD PRESSURE: 60 MMHG | OXYGEN SATURATION: 98 % | WEIGHT: 118 LBS

## 2023-06-20 DIAGNOSIS — T78.2XXA ANAPHYLAXIS, INITIAL ENCOUNTER: Primary | ICD-10-CM

## 2023-06-20 PROCEDURE — 1123F ACP DISCUSS/DSCN MKR DOCD: CPT | Performed by: FAMILY MEDICINE

## 2023-06-20 PROCEDURE — 99214 OFFICE O/P EST MOD 30 MIN: CPT | Performed by: FAMILY MEDICINE

## 2023-06-20 PROCEDURE — G8399 PT W/DXA RESULTS DOCUMENT: HCPCS | Performed by: FAMILY MEDICINE

## 2023-06-20 PROCEDURE — G8419 CALC BMI OUT NRM PARAM NOF/U: HCPCS | Performed by: FAMILY MEDICINE

## 2023-06-20 PROCEDURE — 1090F PRES/ABSN URINE INCON ASSESS: CPT | Performed by: FAMILY MEDICINE

## 2023-06-20 PROCEDURE — G8427 DOCREV CUR MEDS BY ELIG CLIN: HCPCS | Performed by: FAMILY MEDICINE

## 2023-06-20 PROCEDURE — 3017F COLORECTAL CA SCREEN DOC REV: CPT | Performed by: FAMILY MEDICINE

## 2023-06-20 PROCEDURE — 1036F TOBACCO NON-USER: CPT | Performed by: FAMILY MEDICINE

## 2023-06-20 RX ORDER — EPINEPHRINE 0.3 MG/.3ML
0.3 INJECTION SUBCUTANEOUS ONCE
Qty: 0.3 ML | Refills: 0 | Status: SHIPPED | OUTPATIENT
Start: 2023-06-20 | End: 2023-06-20

## 2023-06-20 ASSESSMENT — PATIENT HEALTH QUESTIONNAIRE - PHQ9
SUM OF ALL RESPONSES TO PHQ9 QUESTIONS 1 & 2: 0
SUM OF ALL RESPONSES TO PHQ QUESTIONS 1-9: 0
9. THOUGHTS THAT YOU WOULD BE BETTER OFF DEAD, OR OF HURTING YOURSELF: 0
4. FEELING TIRED OR HAVING LITTLE ENERGY: 0
SUM OF ALL RESPONSES TO PHQ QUESTIONS 1-9: 0
3. TROUBLE FALLING OR STAYING ASLEEP: 0
SUM OF ALL RESPONSES TO PHQ QUESTIONS 1-9: 0
1. LITTLE INTEREST OR PLEASURE IN DOING THINGS: 0
5. POOR APPETITE OR OVEREATING: 0
6. FEELING BAD ABOUT YOURSELF - OR THAT YOU ARE A FAILURE OR HAVE LET YOURSELF OR YOUR FAMILY DOWN: 0
2. FEELING DOWN, DEPRESSED OR HOPELESS: 0
SUM OF ALL RESPONSES TO PHQ QUESTIONS 1-9: 0
8. MOVING OR SPEAKING SO SLOWLY THAT OTHER PEOPLE COULD HAVE NOTICED. OR THE OPPOSITE, BEING SO FIGETY OR RESTLESS THAT YOU HAVE BEEN MOVING AROUND A LOT MORE THAN USUAL: 0
7. TROUBLE CONCENTRATING ON THINGS, SUCH AS READING THE NEWSPAPER OR WATCHING TELEVISION: 0

## 2023-06-20 NOTE — PROGRESS NOTES
Chief Complaint   Patient presents with    Insect Bite     Tick     Patient says she is having an allergic reaction to a tick nite she had on her left thigh. She says the area is still red however on Saturday evening when she was getting ready for bed she broke out into hives. She says she is concerned she has developed a tick born allergy to mammals and would like labs ordered to test for this. She says she had a tick bite on her face that she had seen Dr. Fernando Kelsey for about one month ago. 1. \"Have you been to the ER, urgent care clinic since your last visit? Hospitalized since your last visit? \" No    2. \"Have you seen or consulted any other health care providers outside of the [address] [place] [address] since your last visit? \" No     3. For patients aged 39-70: Has the patient had a colonoscopy / FIT/ Cologuard? Yes - no Care Gap present      If the patient is female:    4. For patients aged 41-77: Has the patient had a mammogram within the past 2 years? No      5. For patients aged 21-65: Has the patient had a pap smear?  NA - based on age or sex

## 2023-06-20 NOTE — PATIENT INSTRUCTIONS
call 911, even if you feel better. Call 911 if:    You have symptoms of a severe allergic reaction. These may include:  Sudden raised, red areas (hives) all over your body. Swelling of the throat, mouth, lips, or tongue. Trouble breathing. Passing out (losing consciousness). Or you may feel very lightheaded or suddenly feel weak, confused, or restless. Severe belly pain, nausea, vomiting, or diarrhea. You have been given an epinephrine shot, even if you feel better. Call your doctor now or seek immediate medical care if:    You have symptoms of an allergic reaction, such as:  A rash or hives (raised, red areas on the skin). Itching. Swelling. Mild belly pain or nausea. Watch closely for changes in your health, and be sure to contact your doctor if:    You do not get better as expected. Where can you learn more? Go to http://www.[employer].com/ and enter E186 to learn more about \"Anaphylactic Reaction: Care Instructions. \"  Current as of: February 27, 2023               Content Version: 13.7  © 8836-3792 Healthwise, Incorporated. Care instructions adapted under license by [facility] ([facility]). If you have questions about a medical condition or this instruction, always ask your healthcare professional. [address] any warranty or liability for your use of this information.

## 2023-06-20 NOTE — ASSESSMENT & PLAN NOTE
3 systems: derm, CV, GI. Trigger could be meat ingestion or topical goat milk soap or other. Epipen, strict instructions to proceed to ER for ANY recurrence, regardless of response to Epipen which is solely to buy time.  Allergy referral

## 2023-06-20 NOTE — PROGRESS NOTES
Enrique Quijano (: 1955) is a 77 y.o. female, established patient, here for:    ASSESSMENT/PLAN:  1. Anaphylaxis, initial encounter  Assessment & Plan:  3 systems: derm, CV, GI. Trigger could be meat ingestion or topical goat milk soap or other. Epipen, strict instructions to proceed to ER for ANY recurrence, regardless of response to Epipen which is solely to buy time. Allergy referral   Orders:  -     EPINEPHrine (EPIPEN 2-JEM) 0.3 MG/0.3ML SOAJ injection; Inject 0.3 mLs into the skin once for 1 dose Then immediately proceed to the ER, Disp-0.3 mL, R-0Normal  -     External Referral To Allergy             SUBJECTIVE/OBJECTIVE:  HPI  Concerned about possible allergy to meat    Tick on her face approx 12 hours late May. After removal puffy and oozed fluid x 3 days. Still using topical agent for itching. More recent tick bite left leg    Saturday evening (3 days ago) pork stew, ghee, goat mild soap (new)    A few hours later developed itchy \"welts\" on her torso and extremities, hands red and burning, felt weird and like would pass out, nausea, large BM - started to feel better    EMTs had been called. Benadryl. Observed x half an hour. Didn't recommend ER as she was improving. All symptoms have resolved        Physical Exam  Constitutional:       General: She is not in acute distress. Appearance: Normal appearance. HENT:      Head: Normocephalic and atraumatic. Pulmonary:      Effort: Pulmonary effort is normal.   Neurological:      Mental Status: She is alert and oriented to person, place, and time.              -- Poly Anderson MD

## 2023-06-22 ENCOUNTER — TELEPHONE (OUTPATIENT)
Facility: CLINIC | Age: 68
End: 2023-06-22

## 2023-06-27 ENCOUNTER — OFFICE VISIT (OUTPATIENT)
Facility: CLINIC | Age: 68
End: 2023-06-27
Payer: MEDICARE

## 2023-06-27 ENCOUNTER — HOSPITAL ENCOUNTER (OUTPATIENT)
Facility: HOSPITAL | Age: 68
Setting detail: SPECIMEN
Discharge: HOME | End: 2023-06-30
Payer: MEDICARE

## 2023-06-27 VITALS
BODY MASS INDEX: 17.94 KG/M2 | WEIGHT: 118 LBS | SYSTOLIC BLOOD PRESSURE: 84 MMHG | DIASTOLIC BLOOD PRESSURE: 62 MMHG | TEMPERATURE: 98.9 F | RESPIRATION RATE: 16 BRPM | HEART RATE: 70 BPM | OXYGEN SATURATION: 98 %

## 2023-06-27 DIAGNOSIS — E61.1 IRON DEFICIENCY: ICD-10-CM

## 2023-06-27 DIAGNOSIS — E78.2 MIXED HYPERLIPIDEMIA: ICD-10-CM

## 2023-06-27 DIAGNOSIS — T78.2XXD ANAPHYLAXIS, SUBSEQUENT ENCOUNTER: ICD-10-CM

## 2023-06-27 DIAGNOSIS — E55.9 VITAMIN D DEFICIENCY: ICD-10-CM

## 2023-06-27 DIAGNOSIS — Z00.00 MEDICARE ANNUAL WELLNESS VISIT, SUBSEQUENT: Primary | ICD-10-CM

## 2023-06-27 DIAGNOSIS — Z12.31 ENCOUNTER FOR SCREENING MAMMOGRAM FOR BREAST CANCER: ICD-10-CM

## 2023-06-27 LAB
25(OH)D3 SERPL-MCNC: 32.8 NG/ML (ref 30–100)
ALBUMIN SERPL-MCNC: 4.4 G/DL (ref 3.4–5)
ALBUMIN/GLOB SERPL: 1.6 (ref 0.8–1.7)
ALP SERPL-CCNC: 75 U/L (ref 45–117)
ALT SERPL-CCNC: 32 U/L (ref 13–56)
ANION GAP SERPL CALC-SCNC: 2 MMOL/L (ref 3–18)
AST SERPL-CCNC: 20 U/L (ref 10–38)
BILIRUB SERPL-MCNC: 0.7 MG/DL (ref 0.2–1)
BUN SERPL-MCNC: 12 MG/DL (ref 7–18)
BUN/CREAT SERPL: 15 (ref 12–20)
CALCIUM SERPL-MCNC: 9.5 MG/DL (ref 8.5–10.1)
CHLORIDE SERPL-SCNC: 105 MMOL/L (ref 100–111)
CHOLEST SERPL-MCNC: 241 MG/DL
CO2 SERPL-SCNC: 33 MMOL/L (ref 21–32)
CREAT SERPL-MCNC: 0.78 MG/DL (ref 0.6–1.3)
ERYTHROCYTE [DISTWIDTH] IN BLOOD BY AUTOMATED COUNT: 12 % (ref 11.6–14.5)
GLOBULIN SER CALC-MCNC: 2.8 G/DL (ref 2–4)
GLUCOSE SERPL-MCNC: 94 MG/DL (ref 74–99)
HCT VFR BLD AUTO: 41.4 % (ref 35–45)
HDLC SERPL-MCNC: 69 MG/DL (ref 40–60)
HDLC SERPL: 3.5 (ref 0–5)
HGB BLD-MCNC: 13.4 G/DL (ref 12–16)
IRON SATN MFR SERPL: 28 % (ref 20–50)
IRON SERPL-MCNC: 96 UG/DL (ref 50–175)
LDLC SERPL CALC-MCNC: 155.6 MG/DL (ref 0–100)
LIPID PANEL: ABNORMAL
MCH RBC QN AUTO: 30 PG (ref 24–34)
MCHC RBC AUTO-ENTMCNC: 32.4 G/DL (ref 31–37)
MCV RBC AUTO: 92.8 FL (ref 78–100)
NRBC # BLD: 0 K/UL (ref 0–0.01)
NRBC BLD-RTO: 0 PER 100 WBC
PLATELET # BLD AUTO: 151 K/UL (ref 135–420)
PMV BLD AUTO: 12.7 FL (ref 9.2–11.8)
POTASSIUM SERPL-SCNC: 4.5 MMOL/L (ref 3.5–5.5)
PROT SERPL-MCNC: 7.2 G/DL (ref 6.4–8.2)
RBC # BLD AUTO: 4.46 M/UL (ref 4.2–5.3)
SODIUM SERPL-SCNC: 140 MMOL/L (ref 136–145)
TIBC SERPL-MCNC: 346 UG/DL (ref 250–450)
TRIGL SERPL-MCNC: 82 MG/DL
VLDLC SERPL CALC-MCNC: 16.4 MG/DL
WBC # BLD AUTO: 4.4 K/UL (ref 4.6–13.2)

## 2023-06-27 PROCEDURE — G8399 PT W/DXA RESULTS DOCUMENT: HCPCS | Performed by: STUDENT IN AN ORGANIZED HEALTH CARE EDUCATION/TRAINING PROGRAM

## 2023-06-27 PROCEDURE — 1123F ACP DISCUSS/DSCN MKR DOCD: CPT | Performed by: STUDENT IN AN ORGANIZED HEALTH CARE EDUCATION/TRAINING PROGRAM

## 2023-06-27 PROCEDURE — 83540 ASSAY OF IRON: CPT

## 2023-06-27 PROCEDURE — G8419 CALC BMI OUT NRM PARAM NOF/U: HCPCS | Performed by: STUDENT IN AN ORGANIZED HEALTH CARE EDUCATION/TRAINING PROGRAM

## 2023-06-27 PROCEDURE — G0439 PPPS, SUBSEQ VISIT: HCPCS | Performed by: STUDENT IN AN ORGANIZED HEALTH CARE EDUCATION/TRAINING PROGRAM

## 2023-06-27 PROCEDURE — 99213 OFFICE O/P EST LOW 20 MIN: CPT | Performed by: STUDENT IN AN ORGANIZED HEALTH CARE EDUCATION/TRAINING PROGRAM

## 2023-06-27 PROCEDURE — 80053 COMPREHEN METABOLIC PANEL: CPT

## 2023-06-27 PROCEDURE — 85027 COMPLETE CBC AUTOMATED: CPT

## 2023-06-27 PROCEDURE — 83550 IRON BINDING TEST: CPT

## 2023-06-27 PROCEDURE — 1036F TOBACCO NON-USER: CPT | Performed by: STUDENT IN AN ORGANIZED HEALTH CARE EDUCATION/TRAINING PROGRAM

## 2023-06-27 PROCEDURE — 1090F PRES/ABSN URINE INCON ASSESS: CPT | Performed by: STUDENT IN AN ORGANIZED HEALTH CARE EDUCATION/TRAINING PROGRAM

## 2023-06-27 PROCEDURE — 82306 VITAMIN D 25 HYDROXY: CPT

## 2023-06-27 PROCEDURE — 3017F COLORECTAL CA SCREEN DOC REV: CPT | Performed by: STUDENT IN AN ORGANIZED HEALTH CARE EDUCATION/TRAINING PROGRAM

## 2023-06-27 PROCEDURE — 80061 LIPID PANEL: CPT

## 2023-06-27 PROCEDURE — 86003 ALLG SPEC IGE CRUDE XTRC EA: CPT

## 2023-06-27 PROCEDURE — 36415 COLL VENOUS BLD VENIPUNCTURE: CPT

## 2023-06-27 PROCEDURE — G8427 DOCREV CUR MEDS BY ELIG CLIN: HCPCS | Performed by: STUDENT IN AN ORGANIZED HEALTH CARE EDUCATION/TRAINING PROGRAM

## 2023-06-27 ASSESSMENT — PATIENT HEALTH QUESTIONNAIRE - PHQ9
SUM OF ALL RESPONSES TO PHQ QUESTIONS 1-9: 0
1. LITTLE INTEREST OR PLEASURE IN DOING THINGS: 0
2. FEELING DOWN, DEPRESSED OR HOPELESS: 0
SUM OF ALL RESPONSES TO PHQ9 QUESTIONS 1 & 2: 0
SUM OF ALL RESPONSES TO PHQ QUESTIONS 1-9: 0

## 2023-06-27 ASSESSMENT — LIFESTYLE VARIABLES
HOW MANY STANDARD DRINKS CONTAINING ALCOHOL DO YOU HAVE ON A TYPICAL DAY: PATIENT DOES NOT DRINK
HOW OFTEN DO YOU HAVE A DRINK CONTAINING ALCOHOL: NEVER

## 2023-07-01 LAB
ALLERGEN LAMB/MUTTON, IGE, AGAL3: 1.43 KU/L
ALPHA-GAL IGE QN: 31 KU/L
BEEF IGE QN: 2.79 KU/L
IGE SERPL-ACNC: 308 IU/ML (ref 6–495)
Lab: ABNORMAL
PORK IGE QN: 0.74 KU/L

## 2023-07-12 ENCOUNTER — TELEPHONE (OUTPATIENT)
Facility: CLINIC | Age: 68
End: 2023-07-12

## 2023-07-12 NOTE — TELEPHONE ENCOUNTER
Patient called in about referral being faxed to [facility] for an appointment today. Vivi had faxed yesterday but she gave me new fax # today. I also saw in chart that she has established with [facility] a new PCP, Corky Adam since this referral was done. I refaxed it because this happened while under her care but I let her know I was removing us as her PCP and going forward she would need to seek care from her new PCP and/or sign for records to be released to [facility] if needed. She verbalized understanding.

## 2023-07-24 ENCOUNTER — HOSPITAL ENCOUNTER (OUTPATIENT)
Facility: HOSPITAL | Age: 68
Discharge: HOME | End: 2023-07-27
Attending: STUDENT IN AN ORGANIZED HEALTH CARE EDUCATION/TRAINING PROGRAM
Payer: MEDICARE

## 2023-07-24 DIAGNOSIS — Z12.31 ENCOUNTER FOR SCREENING MAMMOGRAM FOR BREAST CANCER: ICD-10-CM

## 2023-07-24 PROCEDURE — 77063 BREAST TOMOSYNTHESIS BI: CPT
